# Patient Record
Sex: MALE | Race: OTHER | Employment: FULL TIME | URBAN - METROPOLITAN AREA
[De-identification: names, ages, dates, MRNs, and addresses within clinical notes are randomized per-mention and may not be internally consistent; named-entity substitution may affect disease eponyms.]

---

## 2017-08-31 ENCOUNTER — HOSPITAL ENCOUNTER (INPATIENT)
Age: 62
LOS: 2 days | Discharge: HOME | DRG: 101 | End: 2017-09-02
Attending: EMERGENCY MEDICINE | Admitting: INTERNAL MEDICINE
Payer: SELF-PAY

## 2017-08-31 ENCOUNTER — APPOINTMENT (OUTPATIENT)
Dept: GENERAL RADIOLOGY | Age: 62
DRG: 101 | End: 2017-08-31
Attending: EMERGENCY MEDICINE
Payer: SELF-PAY

## 2017-08-31 ENCOUNTER — APPOINTMENT (OUTPATIENT)
Dept: CT IMAGING | Age: 62
DRG: 101 | End: 2017-08-31
Attending: EMERGENCY MEDICINE
Payer: SELF-PAY

## 2017-08-31 DIAGNOSIS — R53.1 ACUTE LEFT-SIDED WEAKNESS: ICD-10-CM

## 2017-08-31 DIAGNOSIS — R51.9 NONINTRACTABLE HEADACHE, UNSPECIFIED CHRONICITY PATTERN, UNSPECIFIED HEADACHE TYPE: ICD-10-CM

## 2017-08-31 DIAGNOSIS — R56.9 NEW ONSET SEIZURE (HCC): Primary | ICD-10-CM

## 2017-08-31 DIAGNOSIS — Z87.828 HISTORY OF GUNSHOT WOUND: ICD-10-CM

## 2017-08-31 PROBLEM — I10 HTN (HYPERTENSION): Status: ACTIVE | Noted: 2017-08-31

## 2017-08-31 LAB
ALBUMIN SERPL-MCNC: 3.8 G/DL (ref 3.4–5)
ALBUMIN/GLOB SERPL: 1 {RATIO} (ref 0.8–1.7)
ALP SERPL-CCNC: 61 U/L (ref 45–117)
ALT SERPL-CCNC: 41 U/L (ref 16–61)
AMPHET UR QL SCN: NEGATIVE
ANION GAP SERPL CALC-SCNC: 11 MMOL/L (ref 3–18)
APPEARANCE UR: ABNORMAL
AST SERPL-CCNC: 43 U/L (ref 15–37)
BACTERIA URNS QL MICRO: ABNORMAL /HPF
BARBITURATES UR QL SCN: NEGATIVE
BENZODIAZ UR QL: NEGATIVE
BILIRUB SERPL-MCNC: 0.6 MG/DL (ref 0.2–1)
BILIRUB UR QL: NEGATIVE
BUN SERPL-MCNC: 14 MG/DL (ref 7–18)
BUN/CREAT SERPL: 13 (ref 12–20)
CALCIUM SERPL-MCNC: 9.5 MG/DL (ref 8.5–10.1)
CANNABINOIDS UR QL SCN: NEGATIVE
CHLORIDE SERPL-SCNC: 107 MMOL/L (ref 100–108)
CK MB CFR SERPL CALC: 0.8 % (ref 0–4)
CK MB SERPL-MCNC: 3.3 NG/ML (ref 5–25)
CK SERPL-CCNC: 393 U/L (ref 39–308)
CO2 SERPL-SCNC: 25 MMOL/L (ref 21–32)
COCAINE UR QL SCN: NEGATIVE
COLOR UR: YELLOW
CREAT SERPL-MCNC: 1.12 MG/DL (ref 0.6–1.3)
EPITH CASTS URNS QL MICRO: ABNORMAL /LPF (ref 0–5)
ERYTHROCYTE [DISTWIDTH] IN BLOOD BY AUTOMATED COUNT: 15 % (ref 11.6–14.5)
ETHANOL SERPL-MCNC: <3 MG/DL (ref 0–3)
FIBRINOGEN PPP-MCNC: 301 MG/DL (ref 210–451)
GLOBULIN SER CALC-MCNC: 4 G/DL (ref 2–4)
GLUCOSE SERPL-MCNC: 121 MG/DL (ref 74–99)
GLUCOSE UR STRIP.AUTO-MCNC: NEGATIVE MG/DL
HCT VFR BLD AUTO: 50.5 % (ref 36–48)
HDSCOM,HDSCOM: NORMAL
HGB BLD-MCNC: 16.7 G/DL (ref 13–16)
HGB UR QL STRIP: ABNORMAL
INR PPP: 1.1 (ref 0.8–1.2)
KETONES UR QL STRIP.AUTO: NEGATIVE MG/DL
LEUKOCYTE ESTERASE UR QL STRIP.AUTO: NEGATIVE
MCH RBC QN AUTO: 28.6 PG (ref 24–34)
MCHC RBC AUTO-ENTMCNC: 33.1 G/DL (ref 31–37)
MCV RBC AUTO: 86.6 FL (ref 74–97)
METHADONE UR QL: NEGATIVE
NITRITE UR QL STRIP.AUTO: NEGATIVE
OPIATES UR QL: NEGATIVE
PCP UR QL: NEGATIVE
PH UR STRIP: 5 [PH] (ref 5–8)
PLATELET # BLD AUTO: 217 K/UL (ref 135–420)
PMV BLD AUTO: 10.4 FL (ref 9.2–11.8)
POTASSIUM SERPL-SCNC: 5.5 MMOL/L (ref 3.5–5.5)
PROT SERPL-MCNC: 7.8 G/DL (ref 6.4–8.2)
PROT UR STRIP-MCNC: 100 MG/DL
PROTHROMBIN TIME: 13.4 SEC (ref 11.5–15.2)
RBC # BLD AUTO: 5.83 M/UL (ref 4.7–5.5)
RBC #/AREA URNS HPF: ABNORMAL /HPF (ref 0–5)
SODIUM SERPL-SCNC: 143 MMOL/L (ref 136–145)
SP GR UR REFRACTOMETRY: 1.02 (ref 1–1.03)
THROMBIN TIME: 18.4 SECS (ref 13.8–18.2)
TROPONIN I SERPL-MCNC: 0.02 NG/ML (ref 0–0.04)
UROBILINOGEN UR QL STRIP.AUTO: 0.2 EU/DL (ref 0.2–1)
WBC # BLD AUTO: 11.4 K/UL (ref 4.6–13.2)
WBC URNS QL MICRO: ABNORMAL /HPF (ref 0–4)

## 2017-08-31 PROCEDURE — 99285 EMERGENCY DEPT VISIT HI MDM: CPT

## 2017-08-31 PROCEDURE — 74011250636 HC RX REV CODE- 250/636: Performed by: EMERGENCY MEDICINE

## 2017-08-31 PROCEDURE — 93005 ELECTROCARDIOGRAM TRACING: CPT

## 2017-08-31 PROCEDURE — 85610 PROTHROMBIN TIME: CPT | Performed by: EMERGENCY MEDICINE

## 2017-08-31 PROCEDURE — 74011000258 HC RX REV CODE- 258: Performed by: EMERGENCY MEDICINE

## 2017-08-31 PROCEDURE — 71010 XR CHEST PORT: CPT

## 2017-08-31 PROCEDURE — 70450 CT HEAD/BRAIN W/O DYE: CPT

## 2017-08-31 PROCEDURE — 74011250636 HC RX REV CODE- 250/636

## 2017-08-31 PROCEDURE — 85384 FIBRINOGEN ACTIVITY: CPT | Performed by: EMERGENCY MEDICINE

## 2017-08-31 PROCEDURE — 85027 COMPLETE CBC AUTOMATED: CPT | Performed by: EMERGENCY MEDICINE

## 2017-08-31 PROCEDURE — 96375 TX/PRO/DX INJ NEW DRUG ADDON: CPT

## 2017-08-31 PROCEDURE — 65270000029 HC RM PRIVATE

## 2017-08-31 PROCEDURE — 82550 ASSAY OF CK (CPK): CPT | Performed by: EMERGENCY MEDICINE

## 2017-08-31 PROCEDURE — 80307 DRUG TEST PRSMV CHEM ANLYZR: CPT | Performed by: EMERGENCY MEDICINE

## 2017-08-31 PROCEDURE — 74011000250 HC RX REV CODE- 250: Performed by: EMERGENCY MEDICINE

## 2017-08-31 PROCEDURE — 77030011943

## 2017-08-31 PROCEDURE — 85670 THROMBIN TIME PLASMA: CPT | Performed by: EMERGENCY MEDICINE

## 2017-08-31 PROCEDURE — 81001 URINALYSIS AUTO W/SCOPE: CPT | Performed by: EMERGENCY MEDICINE

## 2017-08-31 PROCEDURE — 96365 THER/PROPH/DIAG IV INF INIT: CPT

## 2017-08-31 PROCEDURE — 80053 COMPREHEN METABOLIC PANEL: CPT | Performed by: EMERGENCY MEDICINE

## 2017-08-31 PROCEDURE — 74011250636 HC RX REV CODE- 250/636: Performed by: INTERNAL MEDICINE

## 2017-08-31 RX ORDER — METOPROLOL SUCCINATE 25 MG/1
25 TABLET, EXTENDED RELEASE ORAL DAILY
Status: DISCONTINUED | OUTPATIENT
Start: 2017-09-01 | End: 2017-09-02 | Stop reason: HOSPADM

## 2017-08-31 RX ORDER — SODIUM CHLORIDE 0.9 % (FLUSH) 0.9 %
5-10 SYRINGE (ML) INJECTION EVERY 8 HOURS
Status: DISCONTINUED | OUTPATIENT
Start: 2017-08-31 | End: 2017-09-02 | Stop reason: HOSPADM

## 2017-08-31 RX ORDER — LORAZEPAM 2 MG/ML
2 INJECTION INTRAMUSCULAR ONCE
Status: COMPLETED | OUTPATIENT
Start: 2017-08-31 | End: 2017-08-31

## 2017-08-31 RX ORDER — ACETAMINOPHEN 325 MG/1
650 TABLET ORAL
Status: DISCONTINUED | OUTPATIENT
Start: 2017-08-31 | End: 2017-09-02 | Stop reason: HOSPADM

## 2017-08-31 RX ORDER — ONDANSETRON 4 MG/1
4 TABLET, ORALLY DISINTEGRATING ORAL
Status: DISCONTINUED | OUTPATIENT
Start: 2017-08-31 | End: 2017-09-02 | Stop reason: HOSPADM

## 2017-08-31 RX ORDER — HYDRALAZINE HYDROCHLORIDE 10 MG/1
10 TABLET, FILM COATED ORAL 2 TIMES DAILY
Status: DISCONTINUED | OUTPATIENT
Start: 2017-08-31 | End: 2017-09-02 | Stop reason: HOSPADM

## 2017-08-31 RX ORDER — ALBUTEROL SULFATE 90 UG/1
1 AEROSOL, METERED RESPIRATORY (INHALATION)
Status: DISCONTINUED | OUTPATIENT
Start: 2017-08-31 | End: 2017-08-31 | Stop reason: CLARIF

## 2017-08-31 RX ORDER — ALBUTEROL SULFATE 0.83 MG/ML
2.5 SOLUTION RESPIRATORY (INHALATION)
Status: DISCONTINUED | OUTPATIENT
Start: 2017-08-31 | End: 2017-09-02 | Stop reason: HOSPADM

## 2017-08-31 RX ORDER — ARFORMOTEROL TARTRATE 15 UG/2ML
15 SOLUTION RESPIRATORY (INHALATION)
Status: DISCONTINUED | OUTPATIENT
Start: 2017-08-31 | End: 2017-09-02 | Stop reason: HOSPADM

## 2017-08-31 RX ORDER — SODIUM CHLORIDE 9 MG/ML
100 INJECTION, SOLUTION INTRAVENOUS CONTINUOUS
Status: DISCONTINUED | OUTPATIENT
Start: 2017-08-31 | End: 2017-09-02 | Stop reason: HOSPADM

## 2017-08-31 RX ORDER — LEVETIRACETAM 10 MG/ML
1000 INJECTION INTRAVASCULAR EVERY 12 HOURS
Status: DISCONTINUED | OUTPATIENT
Start: 2017-09-01 | End: 2017-09-01

## 2017-08-31 RX ORDER — LORAZEPAM 2 MG/ML
INJECTION INTRAMUSCULAR
Status: COMPLETED
Start: 2017-08-31 | End: 2017-08-31

## 2017-08-31 RX ORDER — BUDESONIDE 0.5 MG/2ML
500 INHALANT ORAL
Status: DISCONTINUED | OUTPATIENT
Start: 2017-08-31 | End: 2017-09-02 | Stop reason: HOSPADM

## 2017-08-31 RX ORDER — LORAZEPAM 2 MG/ML
1 INJECTION INTRAMUSCULAR
Status: DISCONTINUED | OUTPATIENT
Start: 2017-08-31 | End: 2017-09-02 | Stop reason: HOSPADM

## 2017-08-31 RX ORDER — SODIUM CHLORIDE 0.9 % (FLUSH) 0.9 %
5-10 SYRINGE (ML) INJECTION AS NEEDED
Status: DISCONTINUED | OUTPATIENT
Start: 2017-08-31 | End: 2017-09-02 | Stop reason: HOSPADM

## 2017-08-31 RX ORDER — LISINOPRIL 10 MG/1
10 TABLET ORAL DAILY
Status: DISCONTINUED | OUTPATIENT
Start: 2017-09-01 | End: 2017-09-02 | Stop reason: HOSPADM

## 2017-08-31 RX ORDER — ENOXAPARIN SODIUM 100 MG/ML
40 INJECTION SUBCUTANEOUS EVERY 24 HOURS
Status: DISCONTINUED | OUTPATIENT
Start: 2017-08-31 | End: 2017-09-02 | Stop reason: HOSPADM

## 2017-08-31 RX ORDER — BUDESONIDE AND FORMOTEROL FUMARATE DIHYDRATE 160; 4.5 UG/1; UG/1
2 AEROSOL RESPIRATORY (INHALATION) EVERY 12 HOURS
Status: DISCONTINUED | OUTPATIENT
Start: 2017-08-31 | End: 2017-08-31 | Stop reason: CLARIF

## 2017-08-31 RX ADMIN — BUDESONIDE 500 MCG: 0.5 INHALANT RESPIRATORY (INHALATION) at 22:05

## 2017-08-31 RX ADMIN — LEVETIRACETAM 2000 MG: 100 INJECTION INTRAVENOUS at 15:34

## 2017-08-31 RX ADMIN — LORAZEPAM 2 MG: 2 INJECTION INTRAMUSCULAR; INTRAVENOUS at 14:43

## 2017-08-31 RX ADMIN — FOLIC ACID: 5 INJECTION, SOLUTION INTRAMUSCULAR; INTRAVENOUS; SUBCUTANEOUS at 15:40

## 2017-08-31 RX ADMIN — SODIUM CHLORIDE 100 ML/HR: 900 INJECTION, SOLUTION INTRAVENOUS at 22:03

## 2017-08-31 RX ADMIN — ARFORMOTEROL TARTRATE 15 MCG: 15 SOLUTION RESPIRATORY (INHALATION) at 21:56

## 2017-08-31 RX ADMIN — SODIUM CHLORIDE 1000 ML: 900 INJECTION, SOLUTION INTRAVENOUS at 15:10

## 2017-08-31 RX ADMIN — ENOXAPARIN SODIUM 40 MG: 40 INJECTION SUBCUTANEOUS at 22:00

## 2017-08-31 RX ADMIN — LORAZEPAM 2 MG: 2 INJECTION INTRAMUSCULAR at 14:43

## 2017-08-31 NOTE — ED NOTES
Pt medicated per MAR. Restraints discontinued due to patient's restful state. Report given to oncoming CLINT Rice.

## 2017-08-31 NOTE — ED NOTES
Received patient report from Annabel [email or web address].[email or web address]. Patients PIV access pulled out at this time. Patient changed and new line will be started.  Fluids held at this time

## 2017-08-31 NOTE — ED NOTES
After returning from CT, pt expreiences full tonic clonic movement. Dr Marzette Opitz observes via teleneurology robot. Dr Slade Laguerre at bedside.

## 2017-08-31 NOTE — IP AVS SNAPSHOT
[address] [address] 
[phone] Patient: Ophelia Wilkinson MRN: [id number] VTU:7/3/5121 You are allergic to the following No active allergies Recent Documentation Height Weight BMI Smoking Status 1.676 m 81.6 kg 29.05 kg/m2 Former Smoker Emergency Contacts Name Discharge Info Relation Home Work Mobile Catherine Singh [5] [phone] Pedro Louie  Friend [5] [phone] About your hospitalization You were admitted on:  August 31, 2017 You last received care in the:  [address] [place] [address] You were discharged on:  September 2, 2017 Unit phone number:  [phone] Why you were hospitalized Your primary diagnosis was:  New Onset Seizure (Hcc) Your diagnoses also included:  Htn (Hypertension) Providers Seen During Your Hospitalizations Provider Role Specialty Primary office phone Sybil Hawley MD Attending Provider Emergency Medicine [phone] Chinmay Edge MD Attending Provider Emergency Medicine [phone] Violet Dover DO Attending Provider Internal Medicine [phone] Your Primary Care Physician (PCP) Primary Care Physician Office Phone Office Fax [address], Stephanie 70 Follow-up Information Follow up With Details Comments Contact Info Sosa Balbuena MD On 9/12/2017 245pm [address] [facility] [address] [address] [address] [address] 
[phone] Yuli Zuniga MD Schedule an appointment as soon as possible for a visit neurology follow-up [address] [facility] 83 [phone] Your Appointments Tuesday September 12, 2017  2:45 PM EDT HOSPITAL FOLLOW-UP with Sosa Balbuena MD  
[address] [address] [address] [facility] [address] [facility] 83 [phone] Current Discharge Medication List  
  
START taking these medications Dose & Instructions Dispensing Information Comments Morning Noon Evening Bedtime  
 levETIRAcetam 1,000 mg tablet Your last dose was: Your next dose is:    
   
   
 Dose:  1000 mg Take 1 Tab by mouth two (2) times a day. Quantity:  180 Tab Refills:  3 CONTINUE these medications which have NOT CHANGED Dose & Instructions Dispensing Information Comments Morning Noon Evening Bedtime  
 albuterol 90 mcg/actuation inhaler Commonly known as:  PROVENTIL HFA, VENTOLIN HFA, PROAIR HFA Your last dose was: Your next dose is:    
   
   
 Dose:  2 Puff Take 2 puffs by inhalation every four (4) hours as needed for Wheezing or Shortness of Breath. Quantity:  1 Inhaler Refills:  1  
     
   
   
   
  
 benzonatate 100 mg capsule Commonly known as:  TESSALON Your last dose was: Your next dose is:    
   
   
 Dose:  100 mg Take 1 capsule by mouth three (3) times daily. Quantity:  30 capsule Refills:  0  
     
   
   
   
  
 budesonide-formoterol 160-4.5 mcg/actuation HFA inhaler Commonly known as:  SYMBICORT Your last dose was: Your next dose is:    
   
   
 Dose:  2 Puff Take 2 puffs by inhalation every twelve (12) hours. Quantity:  1 Inhaler Refills:  1  
     
   
   
   
  
 hydrALAZINE 10 mg tablet Commonly known as:  APRESOLINE Your last dose was: Your next dose is:    
   
   
 Dose:  10 mg Take 1 Tab by mouth two (2) times a day. Quantity:  60 Tab Refills:  1  
     
   
   
   
  
 lisinopril 10 mg tablet Commonly known as:  Laurie Cabello Your last dose was: Your next dose is:    
   
   
 Dose:  10 mg Take 1 tablet by mouth daily. Quantity:  30 tablet Refills:  1  
     
   
   
   
  
 metoprolol succinate 25 mg XL tablet Commonly known as:  TOPROL-XL Your last dose was: Your next dose is:    
   
   
 Dose:  25 mg Take 1 tablet by mouth daily. Quantity:  30 tablet Refills:  1 TYLENOL 325 mg tablet Generic drug:  acetaminophen Your last dose was: Your next dose is:    
   
   
 Dose:  650 mg Take 650 mg by mouth every four (4) hours as needed for Pain. Refills:  0 STOP taking these medications   
 predniSONE 20 mg tablet Commonly known as:  Yoly Max Where to Get Your Medications Information on where to get these meds will be given to you by the nurse or doctor. ! Ask your nurse or doctor about these medications  
  levETIRAcetam 1,000 mg tablet Discharge Instructions DISCHARGE SUMMARY from Nurse The following personal items are in your possession at time of discharge: 
 
Dental Appliances: None Visual Aid: None Home Medications: None Jewelry: None Clothing: At bedside Other Valuables: None Personal Items Sent to Safe:  (taken to safe; returned to pt) PATIENT INSTRUCTIONS: 
 
 
F-face looks uneven A-arms unable to move or move unevenly S-speech slurred or non-existent T-time-call 911 as soon as signs and symptoms begin-DO NOT go Back to bed or wait to see if you get better-TIME IS BRAIN. Warning Signs of HEART ATTACK Call 911 if you have these symptoms: 
? Chest discomfort.  Most heart attacks involve discomfort in the center of the chest that lasts more than a few minutes, or that goes away and comes back. It can feel like uncomfortable pressure, squeezing, fullness, or pain. ? Discomfort in other areas of the upper body. Symptoms can include pain or discomfort in one or both arms, the back, neck, jaw, or stomach. ? Shortness of breath with or without chest discomfort. ? Other signs may include breaking out in a cold sweat, nausea, or lightheadedness. Don't wait more than five minutes to call [address]! Fast action can save your life. Calling 911 is almost always the fastest way to get lifesaving treatment. Emergency Medical Services staff can begin treatment when they arrive  up to an hour sooner than if someone gets to the hospital by car. The discharge information has been reviewed with the patient and caregiver. The patient and caregiver verbalized understanding. Discharge medications reviewed with the patient and caregiver and appropriate educational materials and side effects teaching were provided. Convulsión: Instrucciones de cuidado - [ Seizure: Care Instructions ] Instrucciones de cuidado Las convulsiones son causadas por patrones anormales de señales eléctricas en el cerebro. Son diferentes para cada persona. Las convulsiones pueden afectar el movimiento, el habla, la visión o la conciencia. Algunas personas solo tienen temblores leves en [facility] y no pierden el conocimiento. Otras personas pueden desmayarse y tener temblores violentos en todo el cuerpo. Algunas personas parecen tener la mirada fija en el espacio. Están despiertas, claire no pueden responder normalmente. Más tarde, es posible que no recuerden lo que sucedió. Kathern Lynda necesarias pruebas para identificar el tipo y [address] convulsiones. Un episodio de convulsiones puede ocurrir solo bre vez, o quizás las tenga más de [place]. Maureen los [facility] fueron indicados y hacer un seguimiento con perkins médico puede ayudar a que no tenga más convulsiones. El médico lo otto examinado minuciosamente, claire pueden desarrollarse problemas más tarde. Si nota algún problema o nuevos síntomas, busque tratamiento médico de inmediato. La atención de seguimiento es bre parte clave de perkins tratamiento y seguridad. Asegúrese de hacer y acudir a todas las citas, y llame a perkins médico si está teniendo problemas. También es bre buena idea saber los resultados de jennifer exámenes y mantener bre lista de los medicamentos que radha. Cómo puede cuidarse en el hogar? · Sea sudhir con los medicamentos. Ponder jennifer medicamentos exactamente darren le fueron recetados. Llame a perkins médico si piensa que está teniendo un problema con perkins medicamento. · No realice ninguna actividad que pueda ser peligrosa para usted o los demás [employer] perkins médico diga que es seguro [place]. Por ejemplo, no conduzca un automóvil, opere maquinaria, nade, ni suba por escaleras de mano. · Asegúrese de que quien lo esté tratando por cualquier problema de tomas sepa que usted ha tenido convulsiones y qué medicamentos está tomando para ello. · Identifique y evite las cosas que puedan aumentar jennifer probabilidades de tener convulsiones. Estas pueden incluir la falta de sueño, el consumo de drogas o alcohol, el estrés y la falta de alimentación. · Asegúrese de asistir a la jada de seguimiento. Cuándo debe pedir ayuda? Llame al 911 en cualquier momento que crea que puede necesitar atención de [place]. Por ejemplo, llame si: · Tiene otro episodio de convulsiones. · Tiene más de un episodio de convulsiones en 24 horas. · Tiene nuevos síntomas, tales darren dificultades para caminar, hablar o pensar con claridad. Llame a perkins médico ahora mismo o busque atención médica inmediata si: 
· Usted no está actuando de [place] normal. 
Vigile muy de cerca los cambios en perkins tomas, y asegúrese de comunicarse con perkins médico si tiene algún problema. Dónde puede encontrar más información en inglés? Brendan Calero a [email or web address]-pennie.[email or web address] Ayden Anne N770 en la búsqueda para aprender más acerca de \"Convulsión: Instrucciones de cuidado - [ Seizure: Care Instructions ]. \" 
Revisado: 14 octubre, 2016 Versión del contenido: 11.3 © 4788-4114 Healthwise, Incorporated. Las instrucciones de cuidado fueron adaptadas bajo licencia por Good Help Connections (which disclaims liability or warranty for this information). Si usted tiene [place] afección médica o sobre estas instrucciones, siempre pregunte a perkins profesional de tomas. Healthwise, Incorporated niega toda garantía o responsabilidad por perkins uso de esta información. Dolor en la cornell o la bakari: Instrucciones de cuidado - [ Head or Face Pain: Care Instructions ] Instrucciones de cuidado Algunas causas comunes de alex en la cornell o la bakari son las [place], el estrés y las lesiones. Otras causas incluyen problemas dentales e infecciones de los senos paranasales. Ciertos alimentos, darren el chocolate o el queso, o ciertos líquidos, darren el café o las bebidas de cola, pueden producirles dolor de cornell a algunas personas. Si tiene dolor de [place] leve, es posible que no necesite tratamiento. Es importante observar los síntomas y hablar con perkins médico si el dolor persiste o [place]. La atención de seguimiento es bre parte clave de perkins tratamiento y seguridad. Asegúrese de hacer y acudir a todas las citas, y llame a perkins médico si está teniendo problemas. También es bre buena idea saber los resultados de jennifer exámenes y mantener bre lista de los medicamentos que radha. Cómo puede cuidarse en el hogar? · [employer] analgésicos (medicamentos para el dolor) exactamente darren le fueron indicados. ¨ Si el médico le recetó un analgésico, tómelo según las indicaciones. ¨ Si no está tomando un analgésico recetado, pregúntele a perkins médico si puede amarilis keily de [employer]. · No nuria esfuerzos lorrie los próximos días o más si no se siente alix. · Use bre toalla húmeda tibia o bre almohadilla térmica a baja temperatura para relajar los músculos tensos de los hombros y el violeta. Pídale a alguien que le nuria masajes suaves en el violeta y los hombros. 
· Colóquese hielo o bre compresa fría en la pranav de 10 a 20 minutos cada vez. Póngase un paño antunez entre el hielo y la piel. Cuándo debe pedir ayuda? Llame al 911 en cualquier momento que considere que necesita atención de [place]. Por ejemplo, llame si: · Tiene tics, sacudidas o un episodio de convulsiones. · Se desmayó (perdió el conocimiento). · Tiene síntomas de un ataque cerebral. Estos pueden incluir: 
¨ Entumecimiento, hormigueo, debilidad o parálisis repentinos en la bakari, el brazo o la pierna, sobre todo si ocurre en un solo lado del cuerpo. ¨ Cambios súbitos en la vista. ¨ Problemas repentinos para hablar. ¨ Confusión súbita o dificultad repentina para comprender frases sencillas. ¨ Problemas repentinos para caminar o mantener el equilibrio. ¨ Un dolor de cornell intenso y repentino, distinto a los alex de cornell anteriores. · Tiene dolor en la mandíbula y en el pecho, el hombro, el violeta o el [place]. Llame a perkins médico ahora mismo o busque atención médica inmediata si: · Tiene fiebre junto con rigidez en el violeta o un dolor de cornell intenso. · Tiene náuseas y vómito, o no puede retener alimentos o líquidos en el estómago. Preste especial atención a los cambios en perkins tomas y asegúrese de comunicarse con perkins médico si: 
· El dolor de la cornell o la bakari no mejora darren se esperaba. Dónde puede encontrar más información en inglés? Jodi Barker a http://jose-pennie.info/. Escriba P568 en la búsqueda para aprender más acerca de \"Dolor en la cornell o la bakari: Instrucciones de cuidado - [ Head or Face Pain: Care Instructions ]. \" 
Revisado: 20 marzo, 2017 Versión del contenido: 11.3 © 6235-6293 Healthwise, Incorporated. Las instrucciones de cuidado fueron adaptadas bajo licencia por [employer] Conne[employer] (which disclaims liability or warranty for this information). Si usted tiene [place] afección médica o sobre estas instrucciones, siempre pregunte a perkins profesional de tomas. Healthwise, Incorporated niega toda garantía o responsabilidad por perkins uso de esta información. Discharge Instructions Attachments/References LEVETIRACETAM (ORAL) (Romansh) Discharge Orders None Introducing [facility] SERVICES! [facility] introduce portal paciente MyChart . Ahora se puede acceder a partes de perkins expediente médico, enviar por correo electrónico la oficina de perkins médico y solicitar renovaciones de medicamentos en línea. En perkins navegador de Internet , Tejal Cabezas a [email or web address] Nuria clic en el usuario por Marianna Hernández? Jackie Ethan clic aquí en la sesión Amelia Lealbush. Verá la página de registro [place]. Ingrese perkins código de [employer] otis y darren aparece a continuación. Usted no tendrá que [employer] código después de marc completado el proceso de registro . Si usted no se inscribe antes de la fecha de caducidad , debe solicitar un nuevo código. · MyChart Código de acceso : [id number] Expires: 11/30/2017  3:21 PM 
 
Ingresa los últimos cuatro dígitos de perkins Número de Seguro Social ( xxxx ) y fecha de nacimiento ( dd / mm / aaaa ) darren se indica y nuria clic en Enviar. Vera será llevado a la siguiente página de registro . Crear un ID MyChart . Esta será perkins ID de inicio de sesión de MyChart y no puede ser [place] , por lo que pensar en bre que es Link Chas y fácil de recordar . Crear bre contraseña MyChart . Usted puede cambiar perkins contraseña en cualquier momento . Ingrese perkins Password Reset de preguntas y Kang . [place] se puede utilizar en un momento posterior si usted olvida perkins contraseña. Introduzca perkins dirección de correo electrónico . Wen Ratliff recibirá bre notificación por correo electrónico cuando la nueva información está disponible en MyChart . Silva lozada en Registrarse. Heather Hence chavez y descargar porciones de perkins expediente médico. 
Gill kierra en el enlace de descarga del menú Resumen para descargar bre copia portátil de perkins información médica . Si tiene [employer] , por favor visite la sección de preguntas frecuentes del sitio web MyChart . Recuerde, MyCmylearnadfriendt NO es que se utilizará para las necesidades urgentes. Para emergencias médicas , llame al 911 . Ahora disponible en perkins iPhone y Android ! General Information Please provide this summary of care documentation to your next provider. Patient Signature:  ____________________________________________________________ Date:  ____________________________________________________________  
  
Kathleen Frank Provider Signature:  ____________________________________________________________ Date:  ____________________________________________________________ More Information Levetiracetam (By mouth) Levetiracetam (ouv-zt-wub-RA-se-alvarez) Treats seizures. Brand Name(s): Keppra, Keppra XR, Roweepra, Spritam  
There may be other brand names for this medicine. When This Medicine Should Not Be Used: This medicine is not right for everyone. Do not use it if you had an allergic reaction to levetiracetam. 
How to Use This Medicine:  
Liquid, Tablet, Tablet for Suspension, Long Acting Tablet · Take your medicine as directed. Your dose may need to be changed several times to find what works best for you. Take your medicine at the same time each day. · Regular-release or extended-release tablet: Swallow whole. Do not break, crush, or chew it. · Spritam® dissolving tablet:  Make sure your hands are dry before you handle the tablet.  Do not open the blister pack until you are ready to take a tablet. Peel back the foil and remove the tablet. Do not push the tablet through the foil. ¨ To dissolve the tablet in your mouth, place the tablet on your tongue and take a sip of water. After the tablet has melted, swallow. Do not swallow the tablet whole. ¨ To dissolve the tablet in liquid so you can drink it, put a small amount of liquid (1 tablespoon or enough to cover the medicine) into a cup and add the tablet. Swirl the liquid gently so the tablet dissolves. After the tablet has dissolved, swallow this mixture right away . Then add a small amount of liquid to the cup again, swirl gently, and swallow this liquid so you get the full amount of medicine. · Measure the oral liquid medicine with a marked measuring spoon, oral syringe, or medicine cup. · This medicine should come with a Medication Guide. Ask your pharmacist for a copy if you do not have one. · Missed dose: Take a dose as soon as you remember. If it is almost time for your next dose, wait until then and take a regular dose. Do not take extra medicine to make up for a missed dose. · Store the medicine in a closed container at room temperature, away from heat, moisture, and direct light. Drugs and Foods to Avoid: Ask your doctor or pharmacist before using any other medicine, including over-the-counter medicines, vitamins, and herbal products. Warnings While Using This Medicine: · Tell your doctor if you are pregnant or breastfeeding, or if you have kidney disease or high blood pressure. · This medicine may cause the following problems: 
¨ Decreased numbers of blood cells, including anemia ¨ Serious skin reactions ¨ High blood pressure · This medicine may cause depression, thoughts of suicide, or changes in mood or behavior. Tell your doctor if you have a history of depression or mental health problems. · This medicine may make you dizzy, drowsy, or clumsy.  Do not drive or do anything that could be dangerous until you know how this medicine affects you. · Do not stop using this medicine suddenly. Your doctor will need to slowly decrease your dose before you stop it completely. Your seizures may return or occur more often if you stop using this medicine suddenly. · Tell any doctor or dentist who treats you that you are using this medicine. This medicine may affect certain medical test results. · Your doctor will check your progress and the effects of this medicine at regular visits. Keep all appointments. · Keep all medicine out of the reach of children. Never share your medicine with anyone. Possible Side Effects While Using This Medicine:  
Call your doctor right away if you notice any of these side effects: · Allergic reaction: Itching or hives, swelling in your face or hands, swelling or tingling in your mouth or throat, chest tightness, trouble breathing · Blistering, peeling, red skin rash · Extreme sleepiness, tiredness, or weakness · Fever, chills, cough, sore throat, body aches · Problems with balance, coordination, or walking · Unusual changes in mood or behavior, depression, thoughts of hurting yourself · Unusual bleeding or bruising If you notice these less serious side effects, talk with your doctor: · Decreased appetite, diarrhea, nausea, vomiting · Headache, dizziness · Stuffy or runny nose If you notice other side effects that you think are caused by this medicine, tell your doctor. Call your doctor for medical advice about side effects. You may report side effects to FDA at 5-051-FDA-8621 © 2017 [address]  Information is for End User's use only and may not be sold, redistributed or otherwise used for commercial purposes. The above information is an  only. It is not intended as medical advice for individual conditions or treatments.  Talk to your doctor, nurse or pharmacist before following any medical regimen to see if it is safe and effective for you. [address] [address] 
[phone] Patient: Moy Form MRN: [id number] CQB:[birth year] Tiene alergias a lo siguiente No tiene alergias Documentación recientes Height Weight BMI (IMC) Estatus de tabaquísmo 1.676 m 81.6 kg 29.05 kg/m2 Former Smoker Emergency Contacts Name Discharge Info Relation Home Work Mobile Ko Cummins [5] [phone] LouiePedro gresham [5] [phone] Sobre thomas hospitalización Le admitieron el:  August 31, 2017 Thomas tratamiento más reciente fue el:  [address] CARDIAC TELE Le dieron de ilsa el:  September 2, 2017 Número de teléfono de la unidad:  [phone] Por qué le ingresaron Thomas diagnosis primaria es:  New Onset Seizure (Hcc) Thomas diagnosis también incluye:  Htn (Hypertension) Proveedores de verse lorrie beba hospitalizaciones Personal Médico Rol Especialidad Teléfono principal de la oficina Veronica Rubalcava MD Attending Provider Emergency Medicine [phone] Rahul Bhatti MD Attending Provider Emergency Medicine [phone] Jose Antonio Luz DO Attending Provider Internal Medicine [phone] Thomas médico de atención primaria (PCP ) Primary Care Physician Office Phone Office Fax [address] Follow-up Information Follow up With Details Comments Contact Info Castro Anderson MD On 9/12/2017 245pm [address] [address] [address] [address] [address] 
[phone] Clay Hernandez MD Schedule an appointment as soon as possible for a visit neurology follow-up [address] [facility] [address] [address] [phone] Beba citas Tuesday September 12, 2017  2:45 PM EDT HOSPITAL FOLLOW-UP with Castro Anderson MD  
 [address] [address] [address] [facility] [address] [place] [phone]  
[phone] Aprobación de la gestión actual lista de medicamentos EMPIECE a amarilis Bloxr Dosis e instrucciones Información de dispensación Comentarios Morning Noon Evening Bedtime  
 levETIRAcetam 1,000 mg tablet Your last dose was: Your next dose is:    
   
   
 Dosis:  1000 mg Take 1 Tab by mouth two (2) times a day. cantidad:  180 Tab  
recargas:  3 CONTINUAR estos medicamentos que no [place] Dosis e instrucciones Información de dispensación Comentarios Morning Noon Evening Bedtime  
 albuterol 90 mcg/actuation inhaler También conocido darren:  PROVENTIL HFA, VENTOLIN HFA, PROAIR HFA Your last dose was: Your next dose is:    
   
   
 Dosis:  2 Puff Take 2 puffs by inhalation every four (4) hours as needed for Wheezing or Shortness of Breath. cantidad:  1 Inhaler  
recargas:  1  
     
   
   
   
  
 benzonatate 100 mg capsule También conocido darren:  TESSALON Your last dose was: Your next dose is:    
   
   
 Dosis:  100 mg Take 1 capsule by mouth three (3) times daily. cantidad:  30 capsule  
recargas:  0  
     
   
   
   
  
 budesonide-formoterol 160-4.5 mcg/actuation HFA inhaler También conocido darren:  SYMBICORT Your last dose was: Your next dose is:    
   
   
 Dosis:  2 Puff Take 2 puffs by inhalation every twelve (12) hours. cantidad:  1 Inhaler  
recargas:  1  
     
   
   
   
  
 hydrALAZINE 10 mg tablet También conocido darren:  APRESOLINE Your last dose was: Your next dose is:    
   
   
 Dosis:  10 mg Take 1 Tab by mouth two (2) times a day. cantidad:  60 Tab  
recargas:  1  
     
   
   
   
  
 lisinopril 10 mg tablet También conocido darren:  Corrina Goldmare Your last dose was: Your next dose is:    
   
   
 Dosis:  10 mg Take 1 tablet by mouth daily. cantidad:  30 tablet  
recargas:  1  
     
   
   
   
  
 metoprolol succinate 25 mg XL tablet También conocido darren:  ASIM Your last dose was: Your next dose is:    
   
   
 Dosis:  25 mg Take 1 tablet by mouth daily. cantidad:  30 tablet  
recargas:  1 TYLENOL 325 mg tablet Medicamento genérico:  acetaminophen Your last dose was: Your next dose is:    
   
   
 Dosis:  650 mg Take 650 mg by mouth every four (4) hours as needed for Pain. recargas:  0 DEJE de amarilis estos medicamentos   
 predniSONE 20 mg tablet También conocido darren:  Alphonso Watson Dónde puede recoger jennifer medicamentos Información sobre dónde obtener estos medicamentos se le dará a usted por la enfermera o el médico.   
 ! Pregunte a perkins médico o enfermero/a sobre estos medicamentos  
  levETIRAcetam 1,000 mg tablet Discharge Instructions DISCHARGE SUMMARY from Nurse The following personal items are in your possession at time of discharge: 
 
Dental Appliances: None Visual Aid: None Home Medications: None Jewelry: None Clothing: At bedside Other Valuables: None Personal Items Sent to Safe:  (taken to safe; returned to pt) PATIENT INSTRUCTIONS: 
 
 
F-face looks uneven A-arms unable to move or move unevenly S-speech slurred or non-existent T-time-call 911 as soon as signs and symptoms begin-DO NOT go Back to bed or wait to see if you get better-TIME IS BRAIN. Warning Signs of HEART ATTACK Call 911 if you have these symptoms: 
? Chest discomfort. Most heart attacks involve discomfort in the center of the chest that lasts more than a few minutes, or that goes away and comes back. It can feel like uncomfortable pressure, squeezing, fullness, or pain. ? Discomfort in other areas of the upper body. Symptoms can include pain or discomfort in one or both arms, the back, neck, jaw, or stomach. ? Shortness of breath with or without chest discomfort. ? Other signs may include breaking out in a cold sweat, nausea, or lightheadedness. Don't wait more than five minutes to call [address]! Fast action can save your life. Calling 911 is almost always the fastest way to get lifesaving treatment. Emergency Medical Services staff can begin treatment when they arrive  up to an hour sooner than if someone gets to the hospital by car. The discharge information has been reviewed with the patient and caregiver. The patient and caregiver verbalized understanding. Discharge medications reviewed with the patient and caregiver and appropriate educational materials and side effects teaching were provided. Convulsión: Instrucciones de cuidado - [ Seizure: Care Instructions ] Instrucciones de cuidado Las convulsiones son causadas por patrones anormales de señales eléctricas en el cerebro. Son diferentes para cada persona. Las convulsiones pueden afectar el movimiento, el habla, la visión o la conciencia. Algunas personas solo tienen temblores leves en [facility] y no pierden el conocimiento. Otras personas pueden desmayarse y tener temblores violentos en todo el cuerpo. Algunas personas parecen tener la mirada fija en el espacio. Están despiertas, claire no pueden responder normalmente. Más tarde, es posible que no recuerden lo que sucedió. Dwana Gemma necesarias pruebas para identificar el tipo y [address] convulsiones. Un episodio de convulsiones puede ocurrir solo bre vez, o quizás las tenga más de [place]. Maureen los [employer] fueron indicados y hacer un seguimiento con perkins médico puede ayudar a que no tenga más convulsiones. El médico lo otto examinado minuciosamente, claire pueden desarrollarse problemas más tarde. Si nota algún problema o nuevos síntomas, busque tratamiento médico de inmediato. La atención de seguimiento es bre parte clave de perkins tratamiento y seguridad. Asegúrese de hacer y acudir a todas las citas, y llame a perkins médico si está teniendo problemas. También es bre buena idea saber los resultados de jennifer exámenes y mantener bre lista de los medicamentos que radha. Cómo puede cuidarse en el hogar? · Sea sudhir con los medicamentos. [place] jennifer medicamentos exactamente darren le fueron recetados. Llame a perkins médico si piensa que está teniendo un problema con perkins medicamento. · No realice ninguna actividad que pueda ser peligrosa para usted o los demás [employer] perkins médico diga que es seguro [place]. Por ejemplo, no conduzca un automóvil, opere maquinaria, nade, ni suba por escaleras de mano. · Asegúrese de que quien lo esté tratando por cualquier problema de tomas sepa que usted ha tenido convulsiones y qué medicamentos está tomando para ello. · Identifique y evite las cosas que puedan aumentar jennifer probabilidades de tener convulsiones. Estas pueden incluir la falta de sueño, el consumo de drogas o alcohol, el estrés y la falta de alimentación. · Asegúrese de asistir a la jada de seguimiento. Cuándo debe pedir ayuda? Llame al 911 en cualquier momento que crea que puede necesitar atención de [place]. Por ejemplo, llame si: · Tiene otro episodio de convulsiones. · Tiene más de un episodio de convulsiones en 24 horas. · Tiene nuevos síntomas, tales darren dificultades para caminar, hablar o pensar con claridad.  
Llame a perkins médico ahora mismo o busque atención médica inmediata si: 
 · Usted no está actuando de [place] normal. 
Vigile muy de cerca los cambios en perkins tomas, y asegúrese de comunicarse con perkins médico si tiene algún problema. Dónde puede encontrar más información en inglés? Faustino Olson a [email or web address]-pennie.[email or web address] [place] Spotted K416 en la búsqueda para aprender más acerca de \"Convulsión: Instrucciones de cuidado - [ Seizure: Care Instructions ]. \" 
Revisado: 14 octubre, 2016 Versión del contenido: 11.3 © 2164-4571 Healthwise, Incorporated. Las instrucciones de cuidado fueron adaptadas bajo licencia por Good [employer] Connections (which disclaims liability or warranty for this information). Si usted tiene [place] afección médica o sobre estas instrucciones, siempre pregunte a perkins profesional de tomas. Healthwise, Incorporated niega toda garantía o responsabilidad por perkins uso de esta información. Dolor en la cornell o la bakari: Instrucciones de cuidado - [ Head or Face Pain: Care Instructions ] Instrucciones de cuidado Algunas causas comunes de alex en la cornell o la bakari son las [place], el estrés y las lesiones. Otras causas incluyen problemas dentales e infecciones de los senos paranasales. Ciertos alimentos, darren el chocolate o el queso, o ciertos líquidos, darren el café o las bebidas de cola, pueden producirles dolor de cornell a algunas personas. Si tiene dolor de [place] leve, es posible que no necesite tratamiento. Es importante observar los síntomas y hablar con perkins médico si el dolor persiste o [place]. La atención de seguimiento es bre parte clave de perkins tratamiento y seguridad. Asegúrese de hacer y acudir a todas las citas, y llame a perkins médico si está teniendo problemas. También es bre buena idea saber los resultados de jennifer exámenes y mantener bre lista de los medicamentos que radha. Cómo puede cuidarse en el hogar? · [employer] analgésicos (medicamentos para el dolor) exactamente darren le fueron indicados. ¨ Si el médico le recetó un analgésico, tómelo según las indicaciones. ¨ Si no está tomando un analgésico recetado, pregúntele a perkins médico si puede amarilis keily de [employer]. · No nuria esfuerzos lorrie los próximos días o más si no se siente alix. · Use bre toalla húmeda tibia o bre almohadilla térmica a baja temperatura para relajar los músculos tensos de los hombros y el violeta. Pídale a alguien que le nuria masajes suaves en el violeta y los hombros. 
· Colóquese hielo o bre compresa fría en la pranav de 10 a 20 minutos cada vez. Póngase un paño antunez entre el hielo y la piel. Cuándo debe pedir ayuda? Llame al 911 en cualquier momento que considere que necesita atención de [place]. Por ejemplo, llame si: · Tiene tics, sacudidas o un episodio de convulsiones. · Se desmayó (perdió el conocimiento). · Tiene síntomas de un ataque cerebral. Estos pueden incluir: 
¨ Entumecimiento, hormigueo, debilidad o parálisis repentinos en la bakari, el brazo o la pierna, sobre todo si ocurre en un solo lado del cuerpo. ¨ Cambios súbitos en la vista. ¨ Problemas repentinos para hablar. ¨ Confusión súbita o dificultad repentina para comprender frases sencillas. ¨ Problemas repentinos para caminar o mantener el equilibrio. ¨ Un dolor de cornell intenso y repentino, distinto a los alex de cornell anteriores. · Tiene dolor en la mandíbula y en el pecho, el hombro, el violeta o el Lenord Curb. Llame a perkins médico ahora mismo o busque atención médica inmediata si: · Tiene fiebre junto con rigidez en el violeta o un dolor de cornell intenso. · Tiene náuseas y vómito, o no puede retener alimentos o líquidos en el estómago. Preste especial atención a los cambios en perkins tomas y asegúrese de comunicarse con perkins médico si: 
· El dolor de la cornell o la bakari no mejora darren se esperaba. Dónde puede encontrar más información en inglés? Aylin Bobo a [email or web address]-pennie.info/. Escriba P568 en la búsqueda para aprender más acerca de \"Dolor en la cornell o la bakari: Instrucciones de cuidado - [ Head or Face Pain: Care Instructions ]. \" 
Revisado: 20 marzo, 2017 Versión del contenido: 11.3 © 5963-8282 Healthwise, Incorporated. Las instrucciones de cuidado fueron adaptadas bajo licencia por Good Help Connections (which disclaims liability or warranty for this information). Si usted tiene [place] afección médica o sobre estas instrucciones, siempre pregunte a perkins profesional de tomas. Healthwise, Incorporated niega toda garantía o responsabilidad por perkins uso de esta información. Discharge Instructions Attachments/References LEVETIRACETAM (ORAL) (Latvian) Discharge Orders InCrowd Introducing [facility]! [facility] introduce portal paciente Myworldwallhart . Ahora se puede acceder a partes de perkins expediente médico, enviar por correo electrónico la oficina de perkins médico y solicitar renovaciones de medicamentos en línea. En perkins navegador de Internet , Ellen Savage a https://[employer]. [employer]. Digital Magics/TensorCommt Nuria clic en el usuario por Doris Hutchison? Darreld Flood clic aquí en la sesión Armando [facility]. Verá la página de registro [place]. Ingrese perkins código de [employer] otis y darren aparece a continuación. Usted no tendrá que [employer] código después de marc completado el proceso de registro . Si usted no se inscribe antes de la fecha de caducidad , debe solicitar un nuevo código. · MyChart Código de acceso : [id number] Expires: 11/30/2017  3:21 PM 
 
Ingresa los últimos cuatro dígitos de perkins Número de Seguro Social ( xxxx ) y fecha de nacimiento ( dd / mm / aaaa ) darren se indica y nuria clic en Enviar. Usted será llevado a la siguiente página de registro . Crear un ID MyChart . Esta será perkins ID de inicio de sesión de MyChart y no puede ser [place] , por lo que pensar en bre que es [place] y fácil de recordar . Crear bre contraseña MyChart . Usted puede cambiar perkins contraseña en cualquier momento . Ingrese perkins Password Reset de preguntas y Kang . [place] se puede utilizar en un momento posterior si usted olvida perkins contraseña. Introduzca perkins dirección de correo electrónico . Bong Officer recibirá bre notificación por correo electrónico cuando la nueva información está disponible en MyChart . Helena lozada en Registrarse. Day Shoe chavez y descargar porciones de perkins expediente médico. 
Gill kierra en el enlace de descarga del menú Resumen para descargar bre copia portátil de perkins información médica . Si tiene [employer] , por favor visite la sección de preguntas frecuentes del sitio web Technisyshart . Recuerde, MyChart NO es que se utilizará para las necesidades urgentes. Para emergencias médicas , llame al 911 . Ahora disponible en perkins iPhone y Android ! General Information Please provide this summary of care documentation to your next provider. Patient Signature:  ____________________________________________________________ Date:  ____________________________________________________________  
  
Kourtney Usman Provider Signature:  ____________________________________________________________ Date:  ____________________________________________________________ More Information Levetiracetam (Por la boca) [address] [facility] convulsiones. Jose(s) : Keppra, Keppra XR, Roweepra, Spritam  
Existen muchas otras [address] de Mike. Angelica medicamento no debe ser usado cuando:  
Angelica medicamento no es adecuado para todas las personas. No lo use si usted alguna vez tuvo bre reacción alérgica al levetiracetam. 
Zannie Farm de usar angelica medicamento:  
Adonis Kwon, [place] para Suspensión, [place] de liberación prolongada · [place] jennifer medicamentos darren se le haya indicado.  Es probable que sea necesario cambiar perkins dosis varias veces hasta encontrar la que funciona mejor para usted. [place] perkins medicamento a la misma hora cada día. · Tableta de liberación regular o de liberación extendida:  Pásesela entera. No la [facility], [facility] o [place]. · Tabletas de disolución Spritam®:  Asegúrese de que jennifer meghan estén secas antes de tocar la tableta. No ngozi el blíster hasta que esté listo para amarilis bre tableta. Despegue el papel metálico y saque la tableta. No empuje la tableta a través del aluminio. ¨ Para disolver la tableta en la boca, coloque la tableta en la lengua y amarilis un sorbo de agua. Después de que la tableta se haya derretido, trague. No se trague la tableta entera. ¨ Para disolver la tableta en un líquido para que la pueda amarilis, ponga ber pequeña cantidad de líquido (1 cucharada o lo suficiente para cubrir el medicamento) en bre taza y añada la tableta. Agite el líquido suavemente para que se disuelva la tableta. Después que la tableta se haya disuelto, tragar la mezcla de inmediato. Después, añada de nuevo bre pequeña cantidad de líquido en la taza, agite suavemente, y trague angelica líquido para que tome la cantidad Pineda del medicamento. · Mida el líquido oral con Shania Leader, [place] para uso oral o taza especialmente marcadas para medir medicamentos. · [employer] debe venir con [facility] Guía del medicamento. Solicite bre copia con perkins farmacéutico en kristina de no tener la guía. · Si olvida bre dosis: Si olvida bre dosis de perkins medicamento, tómelo lo más pronto posible. Si es damion la hora para perkins próxima dosis, espere hasta entonces para amarilis perkins dosis regular. No use medicamento adicional para reponer la dosis olvidada. · Guarde el medicamento en un recipiente cerrado a temperatura ambiente y alejado del calor, la humedad y la hermilo directa. Medicamentos y [employer] que debe evitar:  
  
Consulte con perkins médico o farmacéutico antes de usar cualquier medicamento, incluyendo los que compra sin receta médica, las vitaminas y los productos herbales. Precauciones lorrie el uso de Luis Antonio medicamento: · Informe a perkins médico si usted está embarazada o amamantando, o si usted tiene enfermedad del riñón o presión arterial ilsa. · Angelica medicamento puede provocarle los siguientes problemas: ¨ Disminución en el número de glóbulos blancos, incluyendo anemia ¨ Reacciones graves en la piel ¨ Presión arterial ilsa · Es probable que [facility] provoque depresión, pensamientos suicidas o cambios de humor o comportamiento. Informe a perkins médico si tiene antecedentes de depresión o problemas de tomas mental. 
· Angelica medicamento podría producirle mareos, somnolencia o torpeza. No maneje ni nuria otra tarea que pueda ser peligrosa hasta que sepa cómo le afecta angelica medicamento. · No suspenda el uso de angelica medicamento súbitamente. Perkins médico necesitará disminuir perkins dosis poco a poco antes de suspender el medicamento por completo. Las convulsiones pueden volver u ocurrir más a menudo si tj de usar angelica medicamento repentinamente. · Dígale a todo médico o dentista encargado de atenderle que usted está usando Dueñas. Puede que angelica medicamento afecte algunos resultados de [employer]. · Perkins médico tendrá que revisar perkins progreso y los efectos de angelica medicamento lorrie jennifer citas regulares. Cumpla sin falta con todas jennifer citas médicas. Asista a todas jennifer citas. · Guarde todos los medicamentos fuera del alcance de los niños. Nunca comparta jennifer medicamentos con [employer]. Efectos secundarios que pueden presentarse lorrie el uso de angelica medicamento:  
Consulte inmediatamente con el médico si nota cualquiera de estos efectos secundarios: 
· Reacción alérgica: Comezón o ronchas, hinchazón del elma o las meghan, hinchazón u hormigueo en la boca o garganta, opresión en el pecho, dificultad para respirar · Ampollas, despellejamiento, sarpullido steve en la piel. · Somnolencia extrema, cansancio, o debilidad · Esthela Song, escalofríos, tos, garganta irritada, alex en el cuerpo · Problemas con el equilibrio, con la coordinación o para caminar · Cambios inusuales en el estado de ánimo o comportamiento, depresión, pensamientos de querer hacerse daño a si mismo · [address] Consulte con el médico si nota los siguientes efectos secundarios menos graves: · Disminución del apetito, diarrea, náusea o vómito · Dolor de Kiley Bell · Congestión o goteo nasal 
Consulte con el médico si nota otros efectos secundarios que saran son causados por angelica medicamento. Llame a perkins médico para consultarle Eric. Usted puede notificar jennifer efectos secundarios al FDA al [phone]-[phone]. © 2017 [facility] Information is for End User's use only and may not be sold, redistributed or otherwise used for commercial purposes. Esta información es sólo para uso en educación. Perkins intención no es darle un consejo médico sobre enfermedades o tratamientos. Colsulte con perkins Lurena Daft farmacéutico antes de seguir cualquier régimen médico para saber si es seguro y efectivo para usted.

## 2017-08-31 NOTE — IP AVS SNAPSHOT
Millie Chacon 
 
 
 [address] [place] [address] 
[phone] Patient: Santa Díaz MRN: [id number] Current Discharge Medication List  
  
START taking these medications Dose & Instructions Dispensing Information Comments Morning Noon Evening Bedtime  
 levETIRAcetam 1,000 mg tablet Your last dose was: Your next dose is:    
   
   
 Dose:  1000 mg Take 1 Tab by mouth two (2) times a day. Quantity:  180 Tab Refills:  3 CONTINUE these medications which have NOT CHANGED Dose & Instructions Dispensing Information Comments Morning Noon Evening Bedtime  
 albuterol 90 mcg/actuation inhaler Commonly known as:  PROVENTIL HFA, VENTOLIN HFA, PROAIR HFA Your last dose was: Your next dose is:    
   
   
 Dose:  2 Puff Take 2 puffs by inhalation every four (4) hours as needed for Wheezing or Shortness of Breath. Quantity:  1 Inhaler Refills:  1  
     
   
   
   
  
 benzonatate 100 mg capsule Commonly known as:  TESSALON Your last dose was: Your next dose is:    
   
   
 Dose:  100 mg Take 1 capsule by mouth three (3) times daily. Quantity:  30 capsule Refills:  0  
     
   
   
   
  
 budesonide-formoterol 160-4.5 mcg/actuation HFA inhaler Commonly known as:  SYMBICORT Your last dose was: Your next dose is:    
   
   
 Dose:  2 Puff Take 2 puffs by inhalation every twelve (12) hours. Quantity:  1 Inhaler Refills:  1  
     
   
   
   
  
 hydrALAZINE 10 mg tablet Commonly known as:  APRESOLINE Your last dose was: Your next dose is:    
   
   
 Dose:  10 mg Take 1 Tab by mouth two (2) times a day. Quantity:  60 Tab Refills:  1  
     
   
   
   
  
 lisinopril 10 mg tablet Commonly known as:  Dorathy Massed Your last dose was:     
   
Your next dose is:    
   
   
 Dose:  10 mg  
 Take 1 tablet by mouth daily. Quantity:  30 tablet Refills:  1  
     
   
   
   
  
 metoprolol succinate 25 mg XL tablet Commonly known as:  TOPROL-XL Your last dose was: Your next dose is:    
   
   
 Dose:  25 mg Take 1 tablet by mouth daily. Quantity:  30 tablet Refills:  1 TYLENOL 325 mg tablet Generic drug:  acetaminophen Your last dose was: Your next dose is:    
   
   
 Dose:  650 mg Take 650 mg by mouth every four (4) hours as needed for Pain. Refills:  0 STOP taking these medications   
 predniSONE 20 mg tablet Commonly known as:  Alphonso Watson Where to Get Your Medications Information on where to get these meds will be given to you by the nurse or doctor. ! Ask your nurse or doctor about these medications  
  levETIRAcetam 1,000 mg tablet
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
## 2017-08-31 NOTE — ED TRIAGE NOTES
Family states pt became confused at approx 1330 after complaining of headache. PT arrives by EMS with left gaze and difficulty following commands. Assessment complicated by language barrier. Pt is Upper sorbian speaking only.
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
## 2017-09-01 LAB
ANION GAP SERPL CALC-SCNC: 5 MMOL/L (ref 3–18)
ATRIAL RATE: 93 BPM
BUN SERPL-MCNC: 12 MG/DL (ref 7–18)
BUN/CREAT SERPL: 16 (ref 12–20)
CALCIUM SERPL-MCNC: 8.2 MG/DL (ref 8.5–10.1)
CALCULATED P AXIS, ECG09: 78 DEGREES
CALCULATED R AXIS, ECG10: -71 DEGREES
CALCULATED T AXIS, ECG11: 86 DEGREES
CHLORIDE SERPL-SCNC: 105 MMOL/L (ref 100–108)
CO2 SERPL-SCNC: 29 MMOL/L (ref 21–32)
CREAT SERPL-MCNC: 0.75 MG/DL (ref 0.6–1.3)
DIAGNOSIS, 93000: NORMAL
ERYTHROCYTE [DISTWIDTH] IN BLOOD BY AUTOMATED COUNT: 15.1 % (ref 11.6–14.5)
GLUCOSE SERPL-MCNC: 106 MG/DL (ref 74–99)
HCT VFR BLD AUTO: 43.5 % (ref 36–48)
HGB BLD-MCNC: 14.1 G/DL (ref 13–16)
MCH RBC QN AUTO: 27.4 PG (ref 24–34)
MCHC RBC AUTO-ENTMCNC: 32.4 G/DL (ref 31–37)
MCV RBC AUTO: 84.6 FL (ref 74–97)
P-R INTERVAL, ECG05: 168 MS
PLATELET # BLD AUTO: 176 K/UL (ref 135–420)
PMV BLD AUTO: 9.7 FL (ref 9.2–11.8)
POTASSIUM SERPL-SCNC: 4 MMOL/L (ref 3.5–5.5)
Q-T INTERVAL, ECG07: 392 MS
QRS DURATION, ECG06: 122 MS
QTC CALCULATION (BEZET), ECG08: 487 MS
RBC # BLD AUTO: 5.14 M/UL (ref 4.7–5.5)
SODIUM SERPL-SCNC: 139 MMOL/L (ref 136–145)
TSH SERPL DL<=0.05 MIU/L-ACNC: 1.19 UIU/ML (ref 0.36–3.74)
VENTRICULAR RATE, ECG03: 93 BPM
WBC # BLD AUTO: 8 K/UL (ref 4.6–13.2)

## 2017-09-01 PROCEDURE — 97162 PT EVAL MOD COMPLEX 30 MIN: CPT

## 2017-09-01 PROCEDURE — 65270000029 HC RM PRIVATE

## 2017-09-01 PROCEDURE — 36415 COLL VENOUS BLD VENIPUNCTURE: CPT | Performed by: INTERNAL MEDICINE

## 2017-09-01 PROCEDURE — 84443 ASSAY THYROID STIM HORMONE: CPT | Performed by: INTERNAL MEDICINE

## 2017-09-01 PROCEDURE — 74011000250 HC RX REV CODE- 250: Performed by: EMERGENCY MEDICINE

## 2017-09-01 PROCEDURE — 74011250636 HC RX REV CODE- 250/636: Performed by: INTERNAL MEDICINE

## 2017-09-01 PROCEDURE — 74011250637 HC RX REV CODE- 250/637: Performed by: INTERNAL MEDICINE

## 2017-09-01 PROCEDURE — 97530 THERAPEUTIC ACTIVITIES: CPT

## 2017-09-01 PROCEDURE — 94640 AIRWAY INHALATION TREATMENT: CPT

## 2017-09-01 PROCEDURE — 85027 COMPLETE CBC AUTOMATED: CPT | Performed by: INTERNAL MEDICINE

## 2017-09-01 PROCEDURE — 97165 OT EVAL LOW COMPLEX 30 MIN: CPT

## 2017-09-01 PROCEDURE — 80048 BASIC METABOLIC PNL TOTAL CA: CPT | Performed by: INTERNAL MEDICINE

## 2017-09-01 PROCEDURE — 97535 SELF CARE MNGMENT TRAINING: CPT

## 2017-09-01 PROCEDURE — 77030020263 HC SOL INJ SOD CL0.9% LFCR 1000ML

## 2017-09-01 PROCEDURE — 92610 EVALUATE SWALLOWING FUNCTION: CPT

## 2017-09-01 RX ORDER — LEVETIRACETAM 500 MG/1
1000 TABLET ORAL 2 TIMES DAILY
Status: DISCONTINUED | OUTPATIENT
Start: 2017-09-01 | End: 2017-09-02 | Stop reason: HOSPADM

## 2017-09-01 RX ADMIN — LEVETIRACETAM 1000 MG: 500 TABLET ORAL at 15:55

## 2017-09-01 RX ADMIN — BUDESONIDE 500 MCG: 0.5 INHALANT RESPIRATORY (INHALATION) at 20:31

## 2017-09-01 RX ADMIN — ACETAMINOPHEN 650 MG: 325 TABLET, FILM COATED ORAL at 11:08

## 2017-09-01 RX ADMIN — LEVETIRACETAM 1000 MG: 10 INJECTION INTRAVENOUS at 02:09

## 2017-09-01 RX ADMIN — ENOXAPARIN SODIUM 40 MG: 40 INJECTION SUBCUTANEOUS at 20:59

## 2017-09-01 RX ADMIN — HYDRALAZINE HYDROCHLORIDE 10 MG: 10 TABLET, FILM COATED ORAL at 11:08

## 2017-09-01 RX ADMIN — LISINOPRIL 10 MG: 10 TABLET ORAL at 11:08

## 2017-09-01 RX ADMIN — ARFORMOTEROL TARTRATE 15 MCG: 15 SOLUTION RESPIRATORY (INHALATION) at 20:31

## 2017-09-01 RX ADMIN — ACETAMINOPHEN 650 MG: 325 TABLET, FILM COATED ORAL at 17:56

## 2017-09-01 RX ADMIN — HYDRALAZINE HYDROCHLORIDE 10 MG: 10 TABLET, FILM COATED ORAL at 21:00

## 2017-09-01 RX ADMIN — METOPROLOL SUCCINATE 25 MG: 25 TABLET, EXTENDED RELEASE ORAL at 11:08

## 2017-09-01 RX ADMIN — ARFORMOTEROL TARTRATE 15 MCG: 15 SOLUTION RESPIRATORY (INHALATION) at 07:33

## 2017-09-01 RX ADMIN — Medication 10 ML: at 21:00

## 2017-09-01 RX ADMIN — BUDESONIDE 500 MCG: 0.5 INHALANT RESPIRATORY (INHALATION) at 07:33

## 2017-09-01 RX ADMIN — SODIUM CHLORIDE 100 ML/HR: 900 INJECTION, SOLUTION INTRAVENOUS at 20:55

## 2017-09-01 RX ADMIN — SODIUM CHLORIDE 100 ML/HR: 900 INJECTION, SOLUTION INTRAVENOUS at 10:23

## 2017-09-01 RX ADMIN — Medication 10 ML: at 15:55

## 2017-09-01 NOTE — PROGRESS NOTES
[facility]:     levetiracetam 1000 mg IV q12h was discontinued and levetiracetam 1000 mg PO q12h ordered. If the patient no longer meets all criteria for oral therapy, the pharmacist will switch back to IV therapy. Thanks.

## 2017-09-01 NOTE — PROGRESS NOTES
Problem: Dysphagia (Adult)  Goal: *Acute Goals and Plan of Care (Insert Text)  Dysphagia Present: No    Recommendations:  Diet: regular  Meds: per pt preference  Aspiration Precautions  Other: eval only     Patient will:  1. Participate in training and education related to continued aspiration risk, diet recs and compensatory strategies (goal met). Outcome: Resolved/Met Date Met:  09/01/17  SPEECH LANGUAGE PATHOLOGY BEDSIDE SWALLOW EVALUATION AND DISCHARGE     Patient: Wicho Jacobo (68 y.o. male)  Date: 9/1/2017  Primary Diagnosis: New onset seizure ([address]        Precautions:   Fall, Seizure      ASSESSMENT :  Clinical beside swallow eval completed per MD orders. Pt A&Ox4. Functional communication; first language Italian. Slovenian speaking SLP was able to converse in Italian with 80% intelligibility. Basic cognitive-linguistic function appears intact. OM examination revealed oral motor structures functional for mastication and deglutition. Presented with thin liquid, puree, and solid trials. Exhibited adequate bolus cohesion, manipulation and A-P transit. Further exhibited (+) swallow timing/reflex and hyolaryngeal excursion. Pt able to manipulate and clear with 0 clinical s/s aspiration and/or oropharyngeal dysphagia. Pt safe for regular solid, thin liquid diet. 0 formal ST needs for dysphagia indicated at this time. SLP educated pt on role of speech therapist in current setting with re: speech/swallow; verbalized comprehension. SLP available for re-evaluation if indicated by MD. Results d/w RN, Ysabel Stark and Clarita Montague. Thank you for this referral.   Eneida Bailey, SLP       PLAN :  Recommendations and Planned Interventions:  No formal ST needs ID'd for dysphagia. Eval only. Discharge Recommendations: None       SUBJECTIVE:   Patient stated Kenosha Macleod you; have a good day. OBJECTIVE:       Past Medical History:   Diagnosis Date    Asthma      Hypertension     No past surgical history on file.   Prior Level of Function/Home Situation: lives alone; independent  Home Situation  Home Environment: Apartment  # Steps to Enter: 12  One/Two Story Residence: One story  Living Alone: Yes  Support Systems: Family member(s), Friends \ neighbors  Patient Expects to be Discharged to[de-identified] Apartment  Current DME Used/Available at Home: None  Diet prior to admission: regular  Current Diet:  regular   Cognitive and Communication Status:  Neurologic State: Alert  Orientation Level: Oriented X4  Cognition: Follows commands  Perception: Appears intact  Perseveration: No perseveration noted  Safety/Judgement: Awareness of environment, Fall prevention  Oral Assessment:  Oral Assessment  Labial: No impairment  Dentition: Intact  Oral Hygiene: good  Lingual: No impairment  Velum: No impairment  Mandible: No impairment  P.O. Trials:  Patient Position: HOB 60  Vocal quality prior to P.O.: No impairment  Consistency Presented: Thin liquid;Pudding; Solid  How Presented: Self-fed/presented;Straw;Successive swallows  How Much: 100  Bolus Acceptance: No impairment  Bolus Formation/Control: No impairment     Propulsion: No impairment  Oral Residue: None  Initiation of Swallow: No impairment  Laryngeal Elevation: Functional  Aspiration Signs/Symptoms: None  Pharyngeal Phase Characteristics: No impairment, issues, or problems      Cues for Modifications: None        Oral Phase Severity: No impairment  Pharyngeal Phase Severity : No impairment     GCODESwallowing:  Swallow Current Status CH= 0%   Swallow Goal Status CH= 0%   Swallow D/C Status CH= 0%     The severity rating is based on the following outcomes:  VIVIAN Noms Swallow Level 7              Clinical Judgement        PAIN:  Start of Eval: 0  End of Eval: 0      After evaluation:   [ ]            Patient left in no apparent distress sitting up in chair  [X]            Patient left in no apparent distress in bed  [X]            Call bell left within reach  [X]            Nursing notified  [ ]            Family present  [ ]            Caregiver present  [ ]            Bed alarm activated         COMMUNICATION/EDUCATION:   [X]            Aspiration precautions; swallow safety; compensatory techniques. [X]            Patient/family have participated as able in goal setting and plan of care. [ ]            Patient/family agree to work toward stated goals and plan of care. [ ]            Patient understands intent and goals of therapy; neutral about participation. [ ]            Patient unable to participate in goal setting/plan of care; educ ongoing with interdisciplinary staff  [ ]             Posted safety precautions in patient's room.      Thank you for this referral.  BECKY Sheppard  Time Calculation: 24 mins

## 2017-09-01 NOTE — PROGRESS NOTES
Problem: Mobility Impaired (Adult and Pediatric)  Goal: *Acute Goals and Plan of Care (Insert Text)  Physical Therapy Goals  Initiated 9/1/2017 and to be accomplished within 7 day(s)  1. Patient will move from supine to sit and sit to supine , scoot up and down and roll side to side in bed with modified independence. 2. Patient will transfer from bed to chair and chair to bed with modified independence using the least restrictive device. 3. Patient will perform sit to stand with modified independence. 4. Patient will ambulate with modified independence for 300 feet with the least restrictive device . 5. Patient will ascend/descend 14 stairs with handrail(s) with modified independence. Outcome: Progressing Towards Goal  PHYSICAL THERAPY EVALUATION     Patient: Mary Aguilar (89 y.o. male)  Date: 9/1/2017  Primary Diagnosis: New onset seizure ([address]        Precautions:   Fall, Seizure      PROBLEM LIST:  Patient presents with the following problems:   Bed Mobility, Transfers, Gait, Strength, Balance, Stairs and Precautions  ASSESSMENT :   Patient requires between supervision/set-up and minimal assistance/contact guard assist for bed mobility, transfers and ambulation. Patient using rw for balance with c/o dizziness with standing and gait decreasing while up. Blood pressure taken  Supine, 133/84,sitting 134/77 and post gait 137/77. Pain reported 5/10 pre and post.  Education on safety and plan of care while in hospital needing reinforcement. Zimbabwean is his  main language, however  did not use blue phone left in bed with call light in reach. Patient will benefit from skilled intervention to address the above impairments.   Patients rehabilitation potential is considered to be Good  Factors which may influence rehabilitation potential include:   [ ]         None noted  [ ]         Mental ability/status  [X]         Medical condition  [ ]         Home/family situation and support systems  [ ] Safety awareness  [ ]         Pain tolerance/management  [ ]         Other:        PLAN :  Recommendations and Planned Interventions:  [X]           Bed Mobility Training             [X]    Neuromuscular Re-Education  [X]           Transfer Training                   [ ]    Orthotic/Prosthetic Training  [X]           Gait Training                          [ ]    Modalities  [X]           Therapeutic Exercises          [ ]    Edema Management/Control  [X]           Therapeutic Activities            [X]    Patient and Family Training/Education  [ ]           Other (comment):     Frequency/Duration: Patient will be followed by physical therapy 1-2 times per day/4-7 days per week to address goals. Discharge Recommendations: Rehab, Outpatient and To Be Determined  Further Equipment Recommendations for Discharge: straight cane ? SUBJECTIVE:   Patient stated .      OBJECTIVE DATA SUMMARY:       Past Medical History:   Diagnosis Date    Asthma      Hypertension     No past surgical history on file. Barriers to Learning/Limitations: yes;  language  Compensate with: visual, verbal, tactile, kinesthetic cues/model     G CODES:Mobility  Current  CJ= 20-39%   Goal  CI= 1-19%. The severity rating is based on the Other [employer] Balance Scale3+/5   [employer] Balance Scale3+/5  0: Pt performs 25% or less of standing activity (Max assist) CN, 100% impaired. 1: Pt supports self with upper extremities but requires therapist assistance. Pt performs 25-50% of effort (Mod assist) CM, 80% to <100% impaired. 1+: Pt supports self with upper extremities but requires therapist assistance. Pt performs >50% effort. (Min assist). CL, 60% to <80% impaired. 2: Pt supports self independently with both upper extremities (walker, crutches, parallel bars). CL, 60% to <80% impaired. 2+: Pt support self independently with 1 upper extremity (cane, crutch, 1 parallel bar).  CK, 40% to <60% impaired. 3: Pt stands without upper extremity support for up to 30 seconds. CK, 40% to <60% impaired. 3+: Pt stands without upper extremity support for 30 seconds or greater. CJ, 20% to <40% impaired. 4: Pt independently moves and returns center of gravity 1-2 inches in one plane. CJ, 20% to <40% impaired. 4+: Pt independently moves and returns center of gravity 1-2 inches in multiple planes. CI, 1% to <20% impaired. 5: Pt independently moves and returns center of gravity in all planes greater than 2 inches. CH, 0% impaired. Eval Complexity: History: MEDIUM  Complexity : 1-2 comorbidities / personal factors will impact the outcome/ POC Exam:MEDIUM Complexity : 3 Standardized tests and measures addressing body structure, function, activity limitation and / or participation in recreation  Presentation: MEDIUM Complexity : Evolving with changing characteristics  Clinical Decision Making:Medium Complexity [facility] Standing Balance Scale3+/5 Overall Complexity:MEDIUM     Prior Level of Function/Home Situation: I with  adl's and ambulation without assistive device. Home Situation  Home Environment: Apartment  # Steps to Enter: 12  One/Two Story Residence: One story  Living Alone: Yes  Support Systems: Family member(s), Friends \ neighbors  Patient Expects to be Discharged to[de-identified] Apartment  Current DME Used/Available at Home: None  Critical Behavior:  Neurologic State: Alert  Orientation Level: Oriented X4  Cognition: Follows commands  Safety/Judgement: Fall prevention; Awareness of environment  Psychosocial  Patient Behaviors: Calm; Cooperative  Purposeful Interaction: Yes  Pt Identified Daily Priority: Clinical issues (comment)  Caritas Process: Establish trust;Enable courtney/hope;Nurture loving kindness  Caring Interventions: Reassure  Reassure: Instilling courtney and hope  Skin Condition/Temp: Warm;Dry  Skin Integrity: Intact  Skin Integumentary  Skin Color: Appropriate for ethnicity  Skin Condition/Temp: Warm;Dry  Skin Integrity: Intact  Strength:    Strength:  (3+-4-/5 both legs )  Range Of Motion:  AROM: Generally decreased, functional  Functional Mobility:  Bed Mobility:  Rolling: Stand-by asssistance;Supervision  Supine to Sit: Supervision;Stand-by asssistance  Sit to Supine: Supervision;Stand-by asssistance  Scooting: Contact guard assistance;Stand-by asssistance  Transfers:  Sit to Stand: Contact guard assistance;Stand-by asssistance; Additional time  Stand to Sit: Contact guard assistance;Stand-by asssistance  Balance:   Sitting: Impaired  Sitting - Static: Good (unsupported)  Sitting - Dynamic: Fair (occasional)  Standing: Impaired  Standing - Static: Fair  Standing - Dynamic : Fair  Ambulation/Gait Training:  Distance (ft): 35 Feet (ft) (x2)  Assistive Device: Walker, rolling  Ambulation - Level of Assistance: Stand-by asssistance;Contact guard assistance; Additional time;Assist x1  Gait Description (WDL): Exceptions to WDL  Gait Abnormalities: Decreased step clearance; Path deviations  Base of Support: Center of gravity altered  Speed/Ashley: Delayed; Slow  Step Length: Left shortened;Right shortened  Interventions: Safety awareness training;Verbal cues; Visual/Demos     Pain:  Pre treatment pain level:5  Post treatment pain level:5  Pain Scale 1: Visual  Activity Tolerance:   Fair   Please refer to the flowsheet for vital signs taken during this treatment. After treatment:   [ ]         Patient left in no apparent distress sitting up in chair  [X]         Patient left in no apparent distress in bed  [X]         Call bell left within reach  [X]         Nursing notified  [ ]         Caregiver present  [ ]         Bed alarm activated      COMMUNICATION/EDUCATION:   [X]         Fall prevention education was provided and the patient/caregiver indicated understanding. [ ]         Patient/family have participated as able in goal setting and plan of care.   [X]         Patient/family agree to work toward stated goals and plan of care. [ ]         Patient understands intent and goals of therapy, but is neutral about his/her participation. [ ]         Patient is unable to participate in goal setting and plan of care. Patient educated on the role of physical therapy during the acute stay  and the importance of mobility. VU.   Needs reinforcement     Thank you for this referral.  Christopher Daily, PT   Time Calculation: 20 mins

## 2017-09-01 NOTE — PROGRESS NOTES
Problem: Self Care Deficits Care Plan (Adult)  Goal: *Acute Goals and Plan of Care (Insert Text)  Occupational Therapy Goals  Initiated 9/1/2017 within 7 day(s). 1. Patient will perform grooming tasks at sink with fair+ dynamic standing balance. 2. Patient will perform lower body dressing with modified independence. 3. Patient will perform functional task in standing for 5 minutes with modified independence and fair+ dynamic standing balance to increase activity tolerance for ADLs. 4. Patient will perform toilet transfers with modified independence. 5. Patient will perform all aspects of toileting with modified independence. 6. Patient will participate in upper extremity therapeutic exercise/activities with modified independence for 8 minutes to maintain BUE strength for ADLs. 7. Patient will utilize energy conservation techniques during functional activities with minimal verbal cues. Outcome: Progressing Towards Goal  OCCUPATIONAL THERAPY EVALUATION     Patient: Alia Krishna (78 y.o. male)  Date: 9/1/2017  Primary Diagnosis: New onset seizure ([facility] [address]        Precautions:  Fall, Seizure      ASSESSMENT :  Based on the objective data described below, the patient presents with impairments with regard to activity tolerance and overall independence in ADLs. Supervision for bed mobility, able to don/doff socks. Good AROM/strength of BUEs. Pt's friend at bedside reports pt was independent PTA, lives alone, still working. CGA for functional transfer and bathroom mobility. 1 LOB in bathroom with mod A to recover; appreciate assistance from [address]. Pt reporting significant dizziness with standing. For safety, due to dizziness/unsteadiness, CGA/SBA for toileting task with vc's to utilize grab bar for safety. SBA for pericare and clothing management. CGA/min A for toilet transfer. Pt reporting resolution of dizziness upon sitting on toilet; recommended to Clarice Castleman, RN that orthostatics be done.  Pt guided back to supine with assistance, verbalizing pain in head, but no pain level provided. Pt instructed to use call bell for assistance, urinal left within reach due to safety issues with bathroom mobility. Recommend continued therapy during acute care stay. Recommend assist x2 to bathroom for safety. Patient will benefit from skilled intervention to address the above impairments. Patients rehabilitation potential is considered to be Good  Factors which may influence rehabilitation potential include:   [ ]             None noted  [ ]             Mental ability/status  [X]             Medical condition  [ ]             Home/family situation and support systems  [ ]             Safety awareness  [ ]             Pain tolerance/management  [ ]             Other:      Recommendations for nursing: up with assist x2 for safety  Written on communication board: yes  Verbally communicated to: Keira Guzman RN and CLINT Scott          PLAN :  Recommendations and Planned Interventions:  [X]               Self Care Training                  [X]        Therapeutic Activities  [X]               Functional Mobility Training    [ ]        Cognitive Retraining  [X]               Therapeutic Exercises           [X]        Endurance Activities  [X]               Balance Training                   [ ]        Neuromuscular Re-Education  [ ]               Visual/Perceptual Training     [X]   Home Safety Training  [X]               Patient Education                 [X]        Family Training/Education  [ ]               Other (comment):     Frequency/Duration: Patient will be followed by occupational therapy 3-5 times a week to address goals.   Discharge Recommendations: Home  (pending progress with stability during functional mobility)  Further Equipment Recommendations for Discharge: shower chair       SUBJECTIVE:   Patient stated Dizzy\"      OBJECTIVE DATA SUMMARY:       Past Medical History:   Diagnosis Date    Asthma      Hypertension     No past surgical history on file. Barriers to Learning/Limitations: yes;  language  Compensate with: visual, verbal, tactile, kinesthetic cues/model     GCODES:  Self Care  Current  CJ= 20-39%   Goal  CI= 1-19%. The severity rating is based on the Other Functional Assessment, MMT, ROM     Eval Complexity: History: LOW Complexity : Brief history review ; Examination: MEDIUM Complexity : 3-5 performance deficits relating to physical, cognitive , or psychosocial skils that result in activity limitations and / or participation restrictions; Decision Making:MEDIUM Complexity : Patient may present with comorbidities that affect occupational performnce. Miniml to moderate modification of tasks or assistance (eg, physical or verbal ) with assesment(s) is necessary to enable patient to complete evaluation      Prior Level of Function/Home Situation:Pt was independent with basic self care tasks and functional mobility PTA. Home Situation  Home Environment: Apartment  # Steps to Enter: 12  One/Two Story Residence: One story  Living Alone: Yes  Support Systems: Family member(s), Friends \ neighbors  Patient Expects to be Discharged to[de-identified] Apartment  Current DME Used/Available at Home: None  [X]  Right hand dominant          [ ]  Left hand dominant     Cognitive/Behavioral Status:  Neurologic State: Alert  Orientation Level: Oriented X4  Cognition: Follows commands  Safety/Judgement: Awareness of environment; Fall prevention      Skin: Intact (BUEs)  Edema: None noted (BUEs)     Coordination:  Coordination: Within functional limits (BUEs)  Fine Motor Skills-Upper: Right Intact; Left Intact    Gross Motor Skills-Upper: Right Intact; Left Intact      Balance:  Sitting: Intact  Standing: Impaired  Standing - Static: Fair  Standing - Dynamic : Fair (Poor at times due to LOB)      Strength:  Strength:  Within functional limits (BUEs: 5/5)     Range of Motion:  AROM: Within functional limits (BUEs: full shoulder/elbow flex) Functional Mobility and Transfers for ADLs:  Bed Mobility:   Supine to Sit: Supervision  Sit to Supine: Supervision     Transfers:  Sit to Stand: Contact guard assistance              Toilet Transfer : Contact guard assistance; Additional time; Adaptive equipment     ADL Assessment:  Feeding: Setup;Supervision  Oral Facial Hygiene/Grooming: Setup;Supervision  Bathing: Contact guard assistance  Upper Body Dressing: Setup;Supervision  Lower Body Dressing: Contact guard assistance  Toileting: Stand by assistance;Contact guard assistance (for safety)     ADL Intervention:  Toileting  Toileting Assistance: Stand-by assistance;Contact guard assistance  Bladder Hygiene: Stand-by assistance  Clothing Management: Stand-by assistance  Cues: Verbal cues provided  Adaptive Equipment: Grab bars     For safety, due to dizziness/unsteadiness with functional transfers and mobility, CGA/SBA for toileting task with vc's to utilize grab bar for safety. SBA for pericare and clothing management. CGA/min A for toilet transfer. Cognitive Retraining  Safety/Judgement: Awareness of environment; Fall prevention     Pain:  Pre treatment pain level: Reports pain in head; no pain level provided  Post treatment pain level: no pain level provided  Pain Scale 1: Visual     Activity Tolerance:  Fair-  Please refer to the flowsheet for vital signs taken during this treatment. After treatment:   [ ] Patient left in no apparent distress sitting up in chair  [X] Patient left in no apparent distress in bed  [X] Call bell left within reach  [X] Nursing notified  [ ] Caregiver present  [ ] Bed alarm activated      COMMUNICATION/EDUCATION: Pt educated on role of OT, POC, and environmental safety. He verbalized understanding. [ ] Home safety education was provided and the patient/caregiver indicated understanding. [X] Patient/family have participated as able in goal setting and plan of care.   [X] Patient/family agree to work toward stated goals and plan of care. [ ] Patient understands intent and goals of therapy, but is neutral about his/her participation. [ ] Patient is unable to participate in goal setting and plan of care.      Thank you for this referral.  Helena Pham OT  Time Calculation: 18 mins

## 2017-09-01 NOTE — MANAGEMENT PLAN
[facility]   Discharge Planning/ Assessment    Reasons for Intervention:   Attempted to interview patient twice. He speaks some English, but not fluently. Have asked for [facility] [employer] Phone for phone to complete full interview. From chart review saw Dr Flip Canales once after last admission and did not follow up again.  consult placed. On last admission, pt was independent with ADLs and used no DME. Lives in apartment with roommates. No insurance. Friends listed, no family in [facility].  Likely will need help with meds  Ruth Chavis RN BSN  Outcomes Manager  Pager # [phone]    High Risk Criteria  [x] Yes  []No   Physician Referral  [] Yes  [x]No        Date    Nursing Referral  [] Yes  [x]No        Date    Patient/Family Request  [] Yes  [x]No        Date       Resources:    Medicare  [] Yes  [x]No   Medicaid  [] Yes  [x]No   No Resources  [x] Yes  []No   Private Insurance  [] Yes  [x]No    Name/Phone Number    Other  [] Yes  [x]No        (i.e. Workman's Comp)         Prior Services:    Prior Services  [] Yes  [x]No   Home Health  [] Yes  [x]No   [address]  [] Yes  [x]No        Number of [address]  [] Yes  [x]No       Meals on Wheels  [] Yes  [x]No   Office on Aging  [] Yes  [x]No   Transportation Services  [] Yes  [x]No   Nursing Home  [] Yes  [x]No        Nursing Home Name    [address]  [] Yes  [x]No        [address] Name    Other       Information Source:      Information obtained from  [x] Patient  [] Parent   [] [address]  [] Child  [] Spouse   [] Significant Other/Partner   [] Friend      [] EMS    [] Nursing Home Chart          [] Other:   Chart Review  [x] Yes  []No     Family/Support System:    Patient lives with  [] Alone    [] Spouse   [] Significant Other  [] Children  [] Caretaker   [] Parent  [] Sibling     [x] Other       Other Support System:    Is the patient responsible for care of others  [] Yes  [x]No Information of person caring for patient on  discharge    Managers financial affairs independently  [x] Yes  []No   If no, explain:      Status Prior to Admission:    Mental Status  [x] Awake  [x] Alert  [x] Oriented  [] Quiet/Calm [] Lethargic/Sedated   [] Disoriented  [] Restless/Anxious  [] Combative   Personal Care  [] Dependent  [x] [address]  [] Requires Assistance   Meal Preparation Ability  [x] Independent   [] Standby Assistance   [] Minimal Assistance   [] Moderate Assistance  [] Maximum Assistance     [] Total Assistance   Chores  [x] Independent with Chores   [] N/A Nursing Home Resident   [] Requires Assistance   Bowel/Bladder  [x] Continent  [] Catheter  [] Incontinent  [] Ostomy Self-Care    [] Urine Diversion Self-Care  [] Maximum Assistance     [] Total Assistance   Number of Persons needed for assistance    DME at home  [] [address] [facility] [place]  [] [address] [facility]   [] Commode    [] Bathroom/Grab Bars  [] Hospital Bed  [] Nebulizer  [] Oxygen           [] Raised Toilet Seat  [] Shower Chair  [] Side Rails for Bed   [] Tub Transfer Bench   [] Norm Bills  [] [address]      [] Other:   Vendor      Treatment Presently Receiving:    Current Treatments  [] Chemotherapy  [] Dialysis  [] Insulin  [] IVAB [] IVF   [] O2  [] PCA   [] PT   [] RT   [] Tube Feedings   [] Wound Care     Psychosocial Evaluation:    Verbalized Knowledge of Disease Process  [] Patient  []Family   Coping with Disease Process  [] Patient  []Family   Requires Further Counseling Coping with Disease Process  [] Patient  []Family     Identified Projected Needs:    Home Health Aid  [] Yes  [x]No   Transportation  [] Yes  [x]No   Education  [] Yes  [x]No        Specific Education     Financial Counseling  [] Yes  [x]No   Inability to Care for Self/Will Require 24 hour care  [] Yes  [x]No   Pain Management  [] Yes  [x]No   Home Infusion Therapy  [] Yes  [x]No   Oxygen Therapy  [] Yes  [x]No   DME  [] Yes  [x]No   Long Term Care Placement  [] Yes  [x]No   Rehab  [] Yes  [x]No   Physical Therapy  [] Yes  [x]No   Needs Anticipated At This Time  [] Yes  [x]No     Intra-Hospital Referral:    [facility]  [] Yes  [x]No     [] Yes  [x]No   Patient Representative  [] Yes  [x]No   Staff for Teaching Needs  [] Yes  [x]No   Specialty Teaching Needs     Diabetic Educator  [] Yes  [x]No   Referral for Diabetic Educator Needed  [] Yes  [x]No  If Yes, place order for Nutritionist or Diabetic Consult     Tentative Discharge Plan:    Home with No Services  [x] Yes  []No   Home with [address]  [] Yes  [x]No        If Yes, specify type    [address]  [] Yes  [x]No        If Yes, specify type    Meals on Wheels  [] Yes  [x]No   Office of Aging  [] Yes  [x]No   NHP  [] Yes  [x]No   Return to the Nursing Home  [] Yes  [x]No   Rehab Therapy  [] Yes  [x]No   Acute Rehab  [] Yes  [x]No   Subacute Rehab  [] Yes  [x]No   Private Care  [] Yes  [x]No   Substance Abuse Referral  [] Yes  [x]No   Transportation  [] Yes  [x]No   Chore Service  [] Yes  [x]No   Inpatient Hospice  [] Yes  [x]No   OP RT  [] Yes  [x] No   OP Hemo  [] Yes  [x] No   OP PT  [] Yes  [x]No   Support Group  [] Yes  [x]No   Reach to Recovery  [] Yes  [x]No   OP Oncology Clinic  [] Yes  [x]No   Clinic Appointment  [] Yes  [x]No   DME  [] Yes  [x]No   Comments    Name of D/C Planner or  Given to Patient or Family Sun Marquez RN BSN  Outcomes Manager  Pager # [phone]   Phone Number         Extension    Date 9/1/2017   Time 1400   If you are discharged home, whom do you designate to participate in your discharge plan and receive any information needed?      Enter name of designee Friends listed        Phone # of designee         Address of designee         Updated         Patient refused to designate any           individual

## 2017-09-01 NOTE — PROGRESS NOTES
Bedside and Verbal shift change report given to harmeet Moura RN (oncoming nurse) by Jarvis Corona RN (offgoing nurse). Report included the following information SBAR, Kardex, Intake/Output, MAR and Recent Results.

## 2017-09-01 NOTE — PROGRESS NOTES
Problem: Falls - Risk of  Goal: *Absence of Falls  Document Ruperto Fall Risk and appropriate interventions in the flowsheet.    Outcome: Progressing Towards Goal  Fall Risk Interventions:  Mobility Interventions: Bed/chair exit alarm, Patient to call before getting OOB, Assess mobility with egress test     Mentation Interventions: Bed/chair exit alarm, Adequate sleep, hydration, pain control, Door open when patient unattended, More frequent rounding, Reorient patient, Room close to nurse's station, Toileting rounds     Medication Interventions: Bed/chair exit alarm, Patient to call before getting OOB, Evaluate medications/consider consulting pharmacy, Teach patient to arise slowly     Elimination Interventions: Call light in reach, Bed/chair exit alarm, Patient to call for help with toileting needs, Urinal in reach

## 2017-09-01 NOTE — PROGRESS NOTES
After making multiple attempts to visit with this patient I come to find that Patient is unable to communicate as he has been resting peacefully at each attempted visit. No family seen.  offered prayer and left Spiritual Care brochure. Chaplains will continue to follow and will provide pastoral care on an as needed/requested basis.     Jcarlos Horn   Spiritual Care   [phone]

## 2017-09-01 NOTE — PROGRESS NOTES
Pt brought from [facility] via stretcher. Pt drowsy but rousable able to respond and follow commands. Pt speaks Ukrainian knows very little [place]. Assessment completed, when asked about pain denies. Seizure precaution in place, HOB elevated bed low and in locked position. 8103- Pt sleeping well no distress noted. IVF infusing well.    0600- Unable to complete required documentation. Pt very sleepy and speaks Ukrainian. Will fully awake and able to communicate will use blue phone.
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
## 2017-09-01 NOTE — PROGRESS NOTES
**[employer] Committee has authorized the Automatic Substitution of  albuterol Oral Inhaler to albuterol nebulizer solution. **  **If the use of an inhaler device is of medical necessity, please indicate Do Not Substitute or Dispense As Written below. **  **[facility] will administer all doses of nebulizer solution in this facility. **    **[employer] Committee has authorized the Automatic Substitution of  Symbicort Oral Inhaler to Arformoterol and Budesonide nebulizer solutions. **  **If the use of an inhaler device is of medical necessity, please indicate Do Not Substitute or Dispense As Written below. **  **[facility] will administer all doses of nebulizer solution in this facility. **
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
## 2017-09-02 VITALS
BODY MASS INDEX: 28.93 KG/M2 | RESPIRATION RATE: 18 BRPM | DIASTOLIC BLOOD PRESSURE: 97 MMHG | OXYGEN SATURATION: 98 % | HEART RATE: 68 BPM | TEMPERATURE: 98.2 F | WEIGHT: 180 LBS | HEIGHT: 66 IN | SYSTOLIC BLOOD PRESSURE: 179 MMHG

## 2017-09-02 LAB
ANION GAP SERPL CALC-SCNC: 8 MMOL/L (ref 3–18)
BUN SERPL-MCNC: 9 MG/DL (ref 7–18)
BUN/CREAT SERPL: 11 (ref 12–20)
CALCIUM SERPL-MCNC: 8.1 MG/DL (ref 8.5–10.1)
CHLORIDE SERPL-SCNC: 107 MMOL/L (ref 100–108)
CO2 SERPL-SCNC: 26 MMOL/L (ref 21–32)
CREAT SERPL-MCNC: 0.79 MG/DL (ref 0.6–1.3)
ERYTHROCYTE [DISTWIDTH] IN BLOOD BY AUTOMATED COUNT: 15.3 % (ref 11.6–14.5)
GLUCOSE SERPL-MCNC: 88 MG/DL (ref 74–99)
HCT VFR BLD AUTO: 41.7 % (ref 36–48)
HGB BLD-MCNC: 13.5 G/DL (ref 13–16)
MCH RBC QN AUTO: 27.7 PG (ref 24–34)
MCHC RBC AUTO-ENTMCNC: 32.4 G/DL (ref 31–37)
MCV RBC AUTO: 85.5 FL (ref 74–97)
PLATELET # BLD AUTO: 183 K/UL (ref 135–420)
PMV BLD AUTO: 10.5 FL (ref 9.2–11.8)
POTASSIUM SERPL-SCNC: 4.5 MMOL/L (ref 3.5–5.5)
RBC # BLD AUTO: 4.88 M/UL (ref 4.7–5.5)
SODIUM SERPL-SCNC: 141 MMOL/L (ref 136–145)
WBC # BLD AUTO: 6.8 K/UL (ref 4.6–13.2)

## 2017-09-02 PROCEDURE — 80048 BASIC METABOLIC PNL TOTAL CA: CPT | Performed by: INTERNAL MEDICINE

## 2017-09-02 PROCEDURE — 77030020263 HC SOL INJ SOD CL0.9% LFCR 1000ML

## 2017-09-02 PROCEDURE — 36415 COLL VENOUS BLD VENIPUNCTURE: CPT | Performed by: INTERNAL MEDICINE

## 2017-09-02 PROCEDURE — 94640 AIRWAY INHALATION TREATMENT: CPT

## 2017-09-02 PROCEDURE — 74011250637 HC RX REV CODE- 250/637: Performed by: INTERNAL MEDICINE

## 2017-09-02 PROCEDURE — 85027 COMPLETE CBC AUTOMATED: CPT | Performed by: INTERNAL MEDICINE

## 2017-09-02 PROCEDURE — 74011250636 HC RX REV CODE- 250/636: Performed by: INTERNAL MEDICINE

## 2017-09-02 PROCEDURE — 94760 N-INVAS EAR/PLS OXIMETRY 1: CPT

## 2017-09-02 PROCEDURE — 74011000250 HC RX REV CODE- 250: Performed by: EMERGENCY MEDICINE

## 2017-09-02 RX ORDER — LEVETIRACETAM 1000 MG/1
1000 TABLET ORAL 2 TIMES DAILY
Qty: 180 TAB | Refills: 3 | Status: SHIPPED | OUTPATIENT
Start: 2017-09-02

## 2017-09-02 RX ADMIN — BUDESONIDE 500 MCG: 0.5 INHALANT RESPIRATORY (INHALATION) at 08:01

## 2017-09-02 RX ADMIN — LISINOPRIL 10 MG: 10 TABLET ORAL at 09:31

## 2017-09-02 RX ADMIN — LEVETIRACETAM 1000 MG: 500 TABLET ORAL at 04:26

## 2017-09-02 RX ADMIN — SODIUM CHLORIDE 100 ML/HR: 900 INJECTION, SOLUTION INTRAVENOUS at 04:27

## 2017-09-02 RX ADMIN — METOPROLOL SUCCINATE 25 MG: 25 TABLET, EXTENDED RELEASE ORAL at 09:31

## 2017-09-02 RX ADMIN — ARFORMOTEROL TARTRATE 15 MCG: 15 SOLUTION RESPIRATORY (INHALATION) at 08:01

## 2017-09-02 RX ADMIN — HYDRALAZINE HYDROCHLORIDE 10 MG: 10 TABLET, FILM COATED ORAL at 09:34

## 2017-09-02 RX ADMIN — Medication 10 ML: at 06:51

## 2017-09-02 NOTE — PROGRESS NOTES
Patient received in bed awake. Patient alert and oriented X4, denies pain and discomfort. Patient resting quietly. Frequent use items within reach. Bed locked in low position. Call bell within reach and patient verbalized understanding of use for assistance and needs. Blue phone at patient bedside. Reeducated Pt on using the call button for assistance to the restroom and educated Pt about not unhooking IV equipment himself. Pt voiced understanding. 1244:  Pt being discharged today. Called nephew Franchesca Garcia to inform him. Frantz stated he would not be able to pick the Pt up until 1500 because he is at work. 1457:  Pt discharged home, accompanied by CNA via wheelchair. Pt has all discharge instructions, along with 1 prescription for Keppra and belongings. Pt and nephew voiced understanding of all discharge instructions.

## 2017-09-02 NOTE — PROGRESS NOTES
Problem: Falls - Risk of  Goal: *Absence of Falls  Document Ruperto Fall Risk and appropriate interventions in the flowsheet.    Outcome: Progressing Towards Goal  Fall Risk Interventions:  Mobility Interventions: Bed/chair exit alarm, Patient to call before getting OOB     Mentation Interventions: Adequate sleep, hydration, pain control, Bed/chair exit alarm, Toileting rounds     Medication Interventions: Patient to call before getting OOB     Elimination Interventions: Call light in reach, Bed/chair exit alarm, Patient to call for help with toileting needs

## 2017-09-02 NOTE — DISCHARGE INSTRUCTIONS
DISCHARGE SUMMARY from Nurse    The following personal items are in your possession at time of discharge:    Dental Appliances: None  Visual Aid: None     Home Medications: None  Jewelry: None  Clothing: At bedside  Other Valuables: None  Personal Items Sent to Safe:  (taken to safe; returned to pt)          PATIENT INSTRUCTIONS:    After general anesthesia or intravenous sedation, for 24 hours or while taking prescription Narcotics:  · Limit your activities  · Do not drive and operate hazardous machinery  · Do not make important personal or business decisions  · Do  not drink alcoholic beverages  · If you have not urinated within 8 hours after discharge, please contact your surgeon on call. Report the following to your surgeon:  · Excessive pain, swelling, redness or odor of or around the surgical area  · Temperature over 100.5  · Nausea and vomiting lasting longer than 4 hours or if unable to take medications  · Any signs of decreased circulation or nerve impairment to extremity: change in color, persistent  numbness, tingling, coldness or increase pain  · Any questions        What to do at Home:  Recommended activity: as prescribed by physician    If you experience any of the following symptoms as listed in the discharge teaching as \"When to call for help\", please follow up with your primary care physician and/or call 911. *  Please give a list of your current medications to your Primary Care Provider. *  Please update this list whenever your medications are discontinued, doses are      changed, or new medications (including over-the-counter products) are added. *  Please carry medication information at all times in case of emergency situations. These are general instructions for a healthy lifestyle:    No smoking/ No tobacco products/ Avoid exposure to second hand smoke    Surgeon General's Warning:  Quitting smoking now greatly reduces serious risk to your health.     Obesity, smoking, and sedentary lifestyle greatly increases your risk for illness    A healthy diet, regular physical exercise & weight monitoring are important for maintaining a healthy lifestyle    You may be retaining fluid if you have a history of heart failure or if you experience any of the following symptoms:  Weight gain of 3 pounds or more overnight or 5 pounds in a week, increased swelling in our hands or feet or shortness of breath while lying flat in bed. Please call your doctor as soon as you notice any of these symptoms; do not wait until your next office visit. Recognize signs and symptoms of STROKE:    F-face looks uneven    A-arms unable to move or move unevenly    S-speech slurred or non-existent    T-time-call 911 as soon as signs and symptoms begin-DO NOT go       Back to bed or wait to see if you get better-TIME IS BRAIN. Warning Signs of HEART ATTACK     Call 911 if you have these symptoms:   Chest discomfort. Most heart attacks involve discomfort in the center of the chest that lasts more than a few minutes, or that goes away and comes back. It can feel like uncomfortable pressure, squeezing, fullness, or pain.  Discomfort in other areas of the upper body. Symptoms can include pain or discomfort in one or both arms, the back, neck, jaw, or stomach.  Shortness of breath with or without chest discomfort.  Other signs may include breaking out in a cold sweat, nausea, or lightheadedness. Don't wait more than five minutes to call 911 - MINUTES MATTER! Fast action can save your life. Calling 911 is almost always the fastest way to get lifesaving treatment. Emergency Medical Services staff can begin treatment when they arrive -- up to an hour sooner than if someone gets to the hospital by car. The discharge information has been reviewed with the patient and caregiver. The patient and caregiver verbalized understanding.     Discharge medications reviewed with the patient and caregiver and appropriate educational materials and side effects teaching were provided. Convulsión: Instrucciones de cuidado - [ Seizure: Care Instructions ]  Instrucciones de [address] convulsiones son causadas por patrones anormales de señales eléctricas en el cerebro. Son diferentes para cada persona. Las convulsiones pueden afectar el movimiento, el habla, la visión o la conciencia. Algunas personas solo tienen temblores leves en [facility] mano y no pierden el conocimiento. Otras personas pueden desmayarse y tener temblores violentos en todo el cuerpo. Algunas personas parecen tener la mirada fija en el espacio. Están despiertas, claire no pueden responder normalmente. Más tarde, es posible que no recuerden lo que sucedió. Michell Gearing necesarias pruebas para identificar el tipo y [address] convulsiones. Un episodio de convulsiones puede ocurrir solo bre vez, o quizás las tenga más de [place]. Maureen los [employer] fueron indicados y hacer un seguimiento con perkins médico puede ayudar a que no tenga más convulsiones. El médico lo otto examinado minuciosamente, claire pueden desarrollarse problemas más tarde. Si nota algún problema o nuevos síntomas, busque tratamiento médico de inmediato. La atención de seguimiento es bre parte clave de perkins tratamiento y seguridad. Asegúrese de hacer y acudir a todas las citas, y llame a perkins médico si está teniendo problemas. También es bre buena idea saber los resultados de jennifer exámenes y mantener bre lista de los medicamentos que radha. ¿Cómo puede cuidarse en el hogar? · Sea sudhir con los medicamentos. [place] jennifer medicamentos exactamente darren le fueron recetados. Llame a perkins médico si piensa que está teniendo un problema con perkins medicamento. · No realice ninguna actividad que pueda ser peligrosa para usted o los demás [employer] perkins médico diga que es seguro [place]. Por ejemplo, no conduzca un automóvil, opere maquinaria, nade, ni suba por escaleras de mano.   · Asegúrese de que quien lo esté tratando por cualquier problema de tomas sepa que usted ha tenido convulsiones y qué medicamentos está tomando para [place]. · Identifique y evite las cosas que puedan aumentar jennifer probabilidades de tener convulsiones. Estas pueden incluir la falta de sueño, el consumo de drogas o alcohol, el estrés y la falta de alimentación. · Asegúrese de asistir a la jada de seguimiento. ¿Cuándo debe pedir ayuda? Llame al 911 en cualquier momento que crea que puede necesitar atención de [place]. Por ejemplo, llame si:  · Tiene otro episodio de convulsiones. · Tiene más de un episodio de convulsiones en 24 horas. · Tiene nuevos síntomas, tales darren dificultades para caminar, hablar o pensar con claridad. Llame a perkins médico ahora mismo o busque atención médica inmediata si:  · Usted no está actuando de [place] normal.  Vigile muy de cerca los cambios en perkins tomas, y asegúrese de comunicarse con perkins médico si tiene algún problema. ¿Dónde puede encontrar más información en inglés? Aliya Bruno a [email or web address]-pennie.info/. [facility] [address] en la búsqueda para aprender más acerca de \"Convulsión: Instrucciones de cuidado - [ Seizure: Care Instructions ]. \"  Revisado: 14 octubre, 2016  Versión del contenido: 11.3  © 7004-1996 Healthwise, Incorporated. Las instrucciones de cuidado fueron adaptadas bajo licencia por [employer] (which disclaims liability or warranty for this information). Si usted tiene Preston Webster afección médica o sobre estas instrucciones, siempre pregunte a perkins profesional de tomas. Healthwise, Incorporated niega toda garantía o responsabilidad por perkins uso de esta información. Dolor en la cornell o la bakari: Instrucciones de cuidado - [ Head or Face Pain: Care Instructions ]  Instrucciones de cuidado  Algunas causas comunes de alex en la cornell o la bakari son las [place], el estrés y las lesiones.  Otras causas incluyen problemas dentales e infecciones de los senos paranasales. Ciertos alimentos, darren el chocolate o el queso, o ciertos líquidos, darren el café o las bebidas de cola, pueden producirles dolor de cornell a algunas personas. Si tiene dolor de [place] leve, es posible que no necesite tratamiento. Es importante observar los síntomas y hablar con perkins médico si el dolor persiste o [place]. La atención de seguimiento es bre parte clave de perkins tratamiento y seguridad. Asegúrese de hacer y acudir a todas las citas, y llame a perkins médico si está teniendo problemas. También es bre buena idea saber los resultados de jennifer exámenes y mantener bre lista de los medicamentos que radha. ¿Cómo puede cuidarse en el hogar? · [employer] analgésicos (medicamentos para el dolor) exactamente darren le fueron indicados. ¨ Si el médico le recetó un analgésico, tómelo según las indicaciones. ¨ Si no está tomando un analgésico recetado, pregúntele a perkins médico si puede amarilis keily de [employer]. · No nuria esfuerzos lorrie los próximos días o más si no se siente alix. · Use bre toalla húmeda tibia o bre almohadilla térmica a baja temperatura para relajar los músculos tensos de los hombros y el violeta. Pídale a alguien que le nuria masajes suaves en el violeta y los hombros.  · Colóquese hielo o bre compresa fría en la pranav de 10 a 20 minutos cada vez. Póngase un paño antunez entre el hielo y la piel. ¿Cuándo debe pedir ayuda? Llame al 911 en cualquier momento que considere que necesita atención de [place]. Por ejemplo, llame si:  · Tiene tics, sacudidas o un episodio de convulsiones. · Se desmayó (perdió el conocimiento). · Tiene síntomas de un ataque cerebral. Estos pueden incluir:  ¨ Entumecimiento, hormigueo, debilidad o parálisis repentinos en la bakari, el brazo o la pierna, sobre todo si ocurre en un solo lado del cuerpo. ¨ Cambios súbitos en la vista. ¨ Problemas repentinos para hablar. ¨ Confusión súbita o dificultad repentina para comprender frases sencillas.   ¨ Problemas repentinos para caminar o mantener el equilibrio. ¨ Un dolor de cornell intenso y repentino, distinto a los alex de cornell anteriores. · Tiene dolor en la mandíbula y en el pecho, el hombro, el violeta o el [place]. Llame a perkins médico ahora mismo o busque atención médica inmediata si:  · Tiene fiebre junto con rigidez en el violeta o un dolor de cornell intenso. · Tiene náuseas y vómito, o no puede retener alimentos o líquidos en el estómago. Preste especial atención a los cambios en perkins tomas y asegúrese de comunicarse con perkins médico si:  · El dolor de la cornell o la bakari no mejora darren se esperaba. ¿Dónde puede encontrar más información en inglés? Aliya espinoza http://jose-pennie.info/. Escriba P568 en la búsqueda para aprender más acerca de \"Dolor en la cornell o la bakari: Instrucciones de cuidado - [ Head or Face Pain: Care Instructions ]. \"  Revisado: 20 marzo, 2017  Versión del contenido: 11.3  © 8718-2783 Healthwise, Incorporated. Las instrucciones de cuidado fueron adaptadas bajo licencia por [employer] (which disclaims liability or warranty for this information). Si usted tiene [place] afección médica o sobre estas instrucciones, siempre pregunte a perkins profesional de tomas. Healthwise, Incorporated niega toda garantía o responsabilidad por perkins uso de esta información.
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
## 2017-09-13 NOTE — ANCILLARY DISCHARGE INSTRUCTIONS
General acute hospital  Discharge Phone Call       After-Care Discharge Phone Call Questions:    Were you able to get your prescriptions filled? Comment:      [x] Yes  []No    Comment if answer is \"No\"   Are you taking your medication(s) as your doctor ordered? Do you understand the purpose of your medications? Comment:    [x] Yes  []No    Comment if answer is \"No\"   Are you taking any other medications that are not on the list?  Comment:      [x] Yes  []No    Comment if answer is \"Yes\"   Do you have any questions about your medications? No  Are you aware of potential side effects? Yes   Comment:    [x] Yes  [x]No    Comment if answer is \"Yes\"   Did you make your follow-up appointments (if the hospital did not do this before  discharge)? Comment:    [x] Yes  []No    Comment if answer is \"No\"   Is there any reason you might not be able to keep your follow-up appointments? Comment:     [] Yes  []No    Comment if answer is \"Yes\"   Do you have any questions about your care plan? No  Are you aware of what health problems to be alert for? Yes   Comment:    [x] Yes  [x]No    Comment if answer is \"Yes\"   Do you have a good understanding of how you should manage your health? Comment:    [x] Yes  []No    Comment if answer is \"Yes\"   Do you know which symptoms to watch for that would mean you would need to call your doctor right away? Comment:      [x] Yes  []No    Comment if answer is \"No\"   Do you have any questions about the follow up process or any instructions that we have provided? Comment:    [] Yes  [x]No    Comment if answer is \"Yes\"   Did staff take your preferences into account?         [x] Yes  []No    Comment if answer is \"Yes\"

## 2017-09-19 ENCOUNTER — DOCUMENTATION ONLY (OUTPATIENT)
Dept: FAMILY MEDICINE CLINIC | Age: 62
End: 2017-09-19

## 2017-09-19 NOTE — LETTER
9/19/2017 Dottie Peoples [address] [facility] [address] [id number] Dear Nona Abdiel Ortez, We had an appointment reserved for you 9/12/2017 and were concerned when you did not show or call within 24 hours to cancel the appointment. Our policy is to call patients two days prior to their appointment to remind them of the date and time. We perform these calls as a courtesy to our patients and to allow us the opportunity to rebook the time slot should the appointment not be necessary. Recognizing that everyones time is valuable and that appointment time is limited, we ask that you provide 24 hours notice if you are unable to keep your appointment. Please call us at your earliest convenience to reschedule your appointment as your provider felt it was important to see you. Thank you for your anticipated cooperation. The scheduling staff: 
 
[address] [place] [address],[address] [phone]

## 2022-08-16 NOTE — PROGRESS NOTES
Problem: Falls - Risk of  Goal: *Absence of Falls  Document Ruperto Fall Risk and appropriate interventions in the flowsheet.    Outcome: Progressing Towards Goal  Fall Risk Interventions:  Mobility Interventions: Bed/chair exit alarm     Mentation Interventions: Adequate sleep, hydration, pain control, Bed/chair exit alarm     Medication Interventions: Assess postural VS orthostatic hypotension, Bed/chair exit alarm     Elimination Interventions: Bed/chair exit alarm, Call light in reach Acitretin Counseling:  I discussed with the patient the risks of acitretin including but not limited to hair loss, dry lips/skin/eyes, liver damage, hyperlipidemia, depression/suicidal ideation, photosensitivity.  Serious rare side effects can include but are not limited to pancreatitis, pseudotumor cerebri, bony changes, clot formation/stroke/heart attack.  Patient understands that alcohol is contraindicated since it can result in liver toxicity and significantly prolong the elimination of the drug by many years.